# Patient Record
Sex: FEMALE | Race: WHITE | NOT HISPANIC OR LATINO | Employment: OTHER | URBAN - METROPOLITAN AREA
[De-identification: names, ages, dates, MRNs, and addresses within clinical notes are randomized per-mention and may not be internally consistent; named-entity substitution may affect disease eponyms.]

---

## 2023-07-31 DIAGNOSIS — E78.5 DYSLIPIDEMIA, GOAL LDL BELOW 130: Primary | ICD-10-CM

## 2023-08-01 RX ORDER — PRAVASTATIN SODIUM 20 MG/1
20 TABLET ORAL DAILY
Qty: 90 TABLET | Refills: 0 | Status: SHIPPED | OUTPATIENT
Start: 2023-08-01 | End: 2023-10-30

## 2023-09-01 DIAGNOSIS — I10 BENIGN ESSENTIAL HYPERTENSION: Primary | ICD-10-CM

## 2023-09-01 RX ORDER — METOPROLOL SUCCINATE 25 MG/1
25 TABLET, EXTENDED RELEASE ORAL DAILY
Qty: 90 TABLET | Refills: 0 | Status: SHIPPED | OUTPATIENT
Start: 2023-09-01 | End: 2023-11-30

## 2023-09-26 ENCOUNTER — LAB (OUTPATIENT)
Dept: LAB | Facility: LAB | Age: 83
End: 2023-09-26
Payer: MEDICARE

## 2023-09-26 ENCOUNTER — OFFICE VISIT (OUTPATIENT)
Dept: PRIMARY CARE | Facility: CLINIC | Age: 83
End: 2023-09-26
Payer: MEDICARE

## 2023-09-26 VITALS
HEART RATE: 68 BPM | DIASTOLIC BLOOD PRESSURE: 84 MMHG | WEIGHT: 178 LBS | OXYGEN SATURATION: 99 % | BODY MASS INDEX: 29.66 KG/M2 | SYSTOLIC BLOOD PRESSURE: 150 MMHG | HEIGHT: 65 IN

## 2023-09-26 DIAGNOSIS — E78.00 PURE HYPERCHOLESTEROLEMIA: ICD-10-CM

## 2023-09-26 DIAGNOSIS — R73.01 IFG (IMPAIRED FASTING GLUCOSE): Primary | ICD-10-CM

## 2023-09-26 DIAGNOSIS — J44.9 COPD, MODERATE (MULTI): ICD-10-CM

## 2023-09-26 DIAGNOSIS — Z12.11 ENCOUNTER FOR SCREENING FOR MALIGNANT NEOPLASM OF COLON: ICD-10-CM

## 2023-09-26 DIAGNOSIS — I71.21 ANEURYSM OF ASCENDING AORTA WITHOUT RUPTURE (CMS-HCC): ICD-10-CM

## 2023-09-26 DIAGNOSIS — Z12.31 VISIT FOR SCREENING MAMMOGRAM: ICD-10-CM

## 2023-09-26 DIAGNOSIS — R73.01 IFG (IMPAIRED FASTING GLUCOSE): ICD-10-CM

## 2023-09-26 DIAGNOSIS — E78.5 DYSLIPIDEMIA, GOAL LDL BELOW 130: ICD-10-CM

## 2023-09-26 DIAGNOSIS — J40 BRONCHITIS: ICD-10-CM

## 2023-09-26 PROBLEM — H61.23 BILATERAL IMPACTED CERUMEN: Status: ACTIVE | Noted: 2020-10-20

## 2023-09-26 PROBLEM — N95.2 POSTMENOPAUSAL ATROPHIC VAGINITIS: Status: ACTIVE | Noted: 2023-09-26

## 2023-09-26 PROBLEM — M85.80 OSTEOPENIA: Status: ACTIVE | Noted: 2023-09-26

## 2023-09-26 PROBLEM — H90.3 SENSORINEURAL HEARING LOSS, BILATERAL: Status: ACTIVE | Noted: 2021-04-20

## 2023-09-26 PROBLEM — G43.009 COMMON MIGRAINE WITHOUT AURA: Status: ACTIVE | Noted: 2023-09-26

## 2023-09-26 PROBLEM — H90.3 ASYMMETRICAL SENSORINEURAL HEARING LOSS: Status: ACTIVE | Noted: 2021-04-20

## 2023-09-26 PROBLEM — L40.9 PSORIASIS: Status: ACTIVE | Noted: 2023-09-26

## 2023-09-26 PROBLEM — R01.1 HEART MURMUR: Status: ACTIVE | Noted: 2023-09-26

## 2023-09-26 PROBLEM — I10 ESSENTIAL HYPERTENSION: Status: ACTIVE | Noted: 2023-09-26

## 2023-09-26 PROBLEM — N28.1 COMPLEX RENAL CYST: Status: ACTIVE | Noted: 2023-09-26

## 2023-09-26 PROBLEM — H61.20 CERUMEN IMPACTION: Status: ACTIVE | Noted: 2023-09-26

## 2023-09-26 PROBLEM — H60.549 ECZEMA OF EXTERNAL AUDITORY CANAL: Status: ACTIVE | Noted: 2020-10-20

## 2023-09-26 PROBLEM — J30.0 VASOMOTOR RHINITIS: Status: ACTIVE | Noted: 2023-09-26

## 2023-09-26 PROBLEM — R06.2 WHEEZING: Status: ACTIVE | Noted: 2023-09-26

## 2023-09-26 PROBLEM — I71.20 THORACIC AORTIC ANEURYSM WITHOUT RUPTURE (CMS-HCC): Status: ACTIVE | Noted: 2023-09-26

## 2023-09-26 PROBLEM — J43.9 PULMONARY EMPHYSEMA (MULTI): Status: ACTIVE | Noted: 2023-09-26

## 2023-09-26 PROBLEM — E04.2 NONTOXIC MULTINODULAR GOITER: Status: ACTIVE | Noted: 2023-09-26

## 2023-09-26 PROBLEM — U07.1 COVID-19 VIRUS INFECTION: Status: ACTIVE | Noted: 2023-09-26

## 2023-09-26 PROBLEM — R39.89 ABNORMAL COMPLIANCE OF BLADDER: Status: ACTIVE | Noted: 2023-09-26

## 2023-09-26 LAB
ALANINE AMINOTRANSFERASE (SGPT) (U/L) IN SER/PLAS: 14 U/L (ref 7–45)
ALBUMIN (G/DL) IN SER/PLAS: 4 G/DL (ref 3.4–5)
ALKALINE PHOSPHATASE (U/L) IN SER/PLAS: 74 U/L (ref 33–136)
ANION GAP IN SER/PLAS: 13 MMOL/L (ref 10–20)
ASPARTATE AMINOTRANSFERASE (SGOT) (U/L) IN SER/PLAS: 17 U/L (ref 9–39)
BASOPHILS (10*3/UL) IN BLOOD BY AUTOMATED COUNT: 0.05 X10E9/L (ref 0–0.1)
BASOPHILS/100 LEUKOCYTES IN BLOOD BY AUTOMATED COUNT: 0.6 % (ref 0–2)
BILIRUBIN TOTAL (MG/DL) IN SER/PLAS: 0.4 MG/DL (ref 0–1.2)
CALCIUM (MG/DL) IN SER/PLAS: 9.4 MG/DL (ref 8.6–10.3)
CARBON DIOXIDE, TOTAL (MMOL/L) IN SER/PLAS: 33 MMOL/L (ref 21–32)
CHLORIDE (MMOL/L) IN SER/PLAS: 96 MMOL/L (ref 98–107)
CHOLESTEROL (MG/DL) IN SER/PLAS: 158 MG/DL (ref 0–199)
CHOLESTEROL IN HDL (MG/DL) IN SER/PLAS: 62.6 MG/DL
CHOLESTEROL/HDL RATIO: 2.5
CREATINE KINASE (U/L) IN SER/PLAS: 127 U/L (ref 0–215)
CREATININE (MG/DL) IN SER/PLAS: 0.64 MG/DL (ref 0.5–1.05)
EOSINOPHILS (10*3/UL) IN BLOOD BY AUTOMATED COUNT: 0.37 X10E9/L (ref 0–0.4)
EOSINOPHILS/100 LEUKOCYTES IN BLOOD BY AUTOMATED COUNT: 4.7 % (ref 0–6)
ERYTHROCYTE DISTRIBUTION WIDTH (RATIO) BY AUTOMATED COUNT: 13.2 % (ref 11.5–14.5)
ERYTHROCYTE MEAN CORPUSCULAR HEMOGLOBIN CONCENTRATION (G/DL) BY AUTOMATED: 31.4 G/DL (ref 32–36)
ERYTHROCYTE MEAN CORPUSCULAR VOLUME (FL) BY AUTOMATED COUNT: 95 FL (ref 80–100)
ERYTHROCYTES (10*6/UL) IN BLOOD BY AUTOMATED COUNT: 4.87 X10E12/L (ref 4–5.2)
ESTIMATED AVERAGE GLUCOSE FOR HBA1C: 123 MG/DL
GFR FEMALE: 87 ML/MIN/1.73M2
GLUCOSE (MG/DL) IN SER/PLAS: 100 MG/DL (ref 74–99)
HEMATOCRIT (%) IN BLOOD BY AUTOMATED COUNT: 46.5 % (ref 36–46)
HEMOGLOBIN (G/DL) IN BLOOD: 14.6 G/DL (ref 12–16)
HEMOGLOBIN A1C/HEMOGLOBIN TOTAL IN BLOOD: 5.9 %
IMMATURE GRANULOCYTES/100 LEUKOCYTES IN BLOOD BY AUTOMATED COUNT: 0.3 % (ref 0–0.9)
LDL: 74 MG/DL (ref 0–99)
LEUKOCYTES (10*3/UL) IN BLOOD BY AUTOMATED COUNT: 7.9 X10E9/L (ref 4.4–11.3)
LYMPHOCYTES (10*3/UL) IN BLOOD BY AUTOMATED COUNT: 1.72 X10E9/L (ref 0.8–3)
LYMPHOCYTES/100 LEUKOCYTES IN BLOOD BY AUTOMATED COUNT: 21.7 % (ref 13–44)
MONOCYTES (10*3/UL) IN BLOOD BY AUTOMATED COUNT: 0.85 X10E9/L (ref 0.05–0.8)
MONOCYTES/100 LEUKOCYTES IN BLOOD BY AUTOMATED COUNT: 10.7 % (ref 2–10)
NEUTROPHILS (10*3/UL) IN BLOOD BY AUTOMATED COUNT: 4.92 X10E9/L (ref 1.6–5.5)
NEUTROPHILS/100 LEUKOCYTES IN BLOOD BY AUTOMATED COUNT: 62 % (ref 40–80)
PLATELETS (10*3/UL) IN BLOOD AUTOMATED COUNT: 418 X10E9/L (ref 150–450)
POTASSIUM (MMOL/L) IN SER/PLAS: 4.3 MMOL/L (ref 3.5–5.3)
PROTEIN TOTAL: 7 G/DL (ref 6.4–8.2)
SODIUM (MMOL/L) IN SER/PLAS: 138 MMOL/L (ref 136–145)
THYROTROPIN (MIU/L) IN SER/PLAS BY DETECTION LIMIT <= 0.05 MIU/L: 2.31 MIU/L (ref 0.44–3.98)
TRIGLYCERIDE (MG/DL) IN SER/PLAS: 107 MG/DL (ref 0–149)
UREA NITROGEN (MG/DL) IN SER/PLAS: 13 MG/DL (ref 6–23)
VLDL: 21 MG/DL (ref 0–40)

## 2023-09-26 PROCEDURE — 83036 HEMOGLOBIN GLYCOSYLATED A1C: CPT

## 2023-09-26 PROCEDURE — 84443 ASSAY THYROID STIM HORMONE: CPT

## 2023-09-26 PROCEDURE — 36415 COLL VENOUS BLD VENIPUNCTURE: CPT

## 2023-09-26 PROCEDURE — 1160F RVW MEDS BY RX/DR IN RCRD: CPT | Performed by: INTERNAL MEDICINE

## 2023-09-26 PROCEDURE — 82550 ASSAY OF CK (CPK): CPT

## 2023-09-26 PROCEDURE — 3077F SYST BP >= 140 MM HG: CPT | Performed by: INTERNAL MEDICINE

## 2023-09-26 PROCEDURE — 80061 LIPID PANEL: CPT

## 2023-09-26 PROCEDURE — 87637 SARSCOV2&INF A&B&RSV AMP PRB: CPT

## 2023-09-26 PROCEDURE — 3079F DIAST BP 80-89 MM HG: CPT | Performed by: INTERNAL MEDICINE

## 2023-09-26 PROCEDURE — 99214 OFFICE O/P EST MOD 30 MIN: CPT | Performed by: INTERNAL MEDICINE

## 2023-09-26 PROCEDURE — 85025 COMPLETE CBC W/AUTO DIFF WBC: CPT

## 2023-09-26 PROCEDURE — 80053 COMPREHEN METABOLIC PANEL: CPT

## 2023-09-26 PROCEDURE — 1159F MED LIST DOCD IN RCRD: CPT | Performed by: INTERNAL MEDICINE

## 2023-09-26 PROCEDURE — 1036F TOBACCO NON-USER: CPT | Performed by: INTERNAL MEDICINE

## 2023-09-26 RX ORDER — HYDROCHLOROTHIAZIDE 25 MG/1
1 TABLET ORAL DAILY
COMMUNITY
Start: 2013-07-29 | End: 2024-04-29 | Stop reason: SDUPTHER

## 2023-09-26 RX ORDER — VIBEGRON 75 MG/1
TABLET, FILM COATED ORAL
COMMUNITY
Start: 2023-08-14 | End: 2023-12-14 | Stop reason: SDUPTHER

## 2023-09-26 RX ORDER — RISANKIZUMAB-RZAA 150 MG/ML
INJECTION SUBCUTANEOUS
COMMUNITY
Start: 2021-07-02

## 2023-09-26 RX ORDER — METHYLPREDNISOLONE 4 MG/1
TABLET ORAL
Qty: 21 TABLET | Refills: 0 | Status: SHIPPED | OUTPATIENT
Start: 2023-09-26 | End: 2023-10-03

## 2023-09-26 ASSESSMENT — ENCOUNTER SYMPTOMS: COUGH: 1

## 2023-09-26 NOTE — PATIENT INSTRUCTIONS
Cough for 1 week, sounds/looks viral on top of copd  Take medrol jhony for lung inflammation  Sending nasal swab for RSV and covid  Get chest xray  No antibiotics unless chest xray suggests pneumonia    Call [phone] to schedule mammogram    Do stool test for colon screening    Make appt for your medicare wellness, bp, cholesterol, sugar check  Get labs 2-3 days prior to appt

## 2023-09-26 NOTE — PROGRESS NOTES
"Subjective   Patient ID: Regina Reyez is a 83 y.o. female who presents for Cough and Nasal Congestion.    Started about 1 week ago with a constant dry cough and nose is running and has some pressure over her cheeks  Just not going away  No ear pain or pressure  Not sob  She has continued to use the IS at home daily  No fever or body aches  Mild headache  Covid tested and was negative 3 days ago.  Has had sore throat from coughing.     Cough         Review of Systems   Respiratory:  Positive for cough.        Objective   /84   Pulse 68   Ht 1.66 m (5' 5.35\")   Wt 80.7 kg (178 lb)   BMI 29.30 kg/m²     Physical Exam  Constitutional:       Appearance: Normal appearance.   HENT:      Ears:      Comments: B/l tm partially obscured by wax but visualized tm normal     Nose:      Comments: Turbs mildly red, scant cream colored drainage     Mouth/Throat:      Pharynx: Posterior oropharyngeal erythema present. No oropharyngeal exudate.   Cardiovascular:      Rate and Rhythm: Normal rate and regular rhythm.   Pulmonary:      Effort: Pulmonary effort is normal.      Comments: BS clear but slightly diminished in the bases  No bronchial sounds  No rhonchi  Slight wheeze with cough  Neurological:      Mental Status: She is alert.         Assessment/Plan          Patient Instructions   Cough for 1 week, sounds/looks viral on top of copd  Take medrol jhony for lung inflammation  Sending nasal swab for RSV and covid  Get chest xray  No antibiotics unless chest xray suggests pneumonia    Call [phone] to schedule mammogram    Do stool test for colon screening    Make appt for your medicare wellness, bp, cholesterol, sugar check  Get labs 2-3 days prior to appt   "

## 2023-09-26 NOTE — PROGRESS NOTES
Subjective   Patient ID: Regina Reyez is a 83 y.o. female who presents for Cough and Nasal Congestion.    HPI     Review of Systems    Objective   There were no vitals taken for this visit.    Physical Exam    Assessment/Plan

## 2023-09-27 LAB
RSV PCR: NOT DETECTED
SARS-COV-2 RESULT: NOT DETECTED

## 2023-10-25 ENCOUNTER — HOSPITAL ENCOUNTER (OUTPATIENT)
Dept: RADIOLOGY | Facility: HOSPITAL | Age: 83
Discharge: HOME | End: 2023-10-25
Payer: MEDICARE

## 2023-10-25 DIAGNOSIS — Z12.31 VISIT FOR SCREENING MAMMOGRAM: ICD-10-CM

## 2023-10-25 PROCEDURE — 77063 BREAST TOMOSYNTHESIS BI: CPT | Mod: BILATERAL PROCEDURE | Performed by: RADIOLOGY

## 2023-10-25 PROCEDURE — 77067 SCR MAMMO BI INCL CAD: CPT | Mod: 50

## 2023-10-25 PROCEDURE — 77067 SCR MAMMO BI INCL CAD: CPT | Mod: BILATERAL PROCEDURE | Performed by: RADIOLOGY

## 2023-10-27 ENCOUNTER — OFFICE VISIT (OUTPATIENT)
Dept: PRIMARY CARE | Facility: CLINIC | Age: 83
End: 2023-10-27
Payer: MEDICARE

## 2023-10-27 VITALS
HEART RATE: 72 BPM | BODY MASS INDEX: 29.99 KG/M2 | SYSTOLIC BLOOD PRESSURE: 135 MMHG | DIASTOLIC BLOOD PRESSURE: 64 MMHG | HEIGHT: 65 IN | WEIGHT: 180 LBS

## 2023-10-27 DIAGNOSIS — Z00.00 ROUTINE GENERAL MEDICAL EXAMINATION AT HEALTH CARE FACILITY: Primary | ICD-10-CM

## 2023-10-27 DIAGNOSIS — E78.00 PURE HYPERCHOLESTEROLEMIA: ICD-10-CM

## 2023-10-27 DIAGNOSIS — I10 ESSENTIAL HYPERTENSION: ICD-10-CM

## 2023-10-27 DIAGNOSIS — R73.01 IFG (IMPAIRED FASTING GLUCOSE): ICD-10-CM

## 2023-10-27 PROCEDURE — 1159F MED LIST DOCD IN RCRD: CPT | Performed by: INTERNAL MEDICINE

## 2023-10-27 PROCEDURE — 1036F TOBACCO NON-USER: CPT | Performed by: INTERNAL MEDICINE

## 2023-10-27 PROCEDURE — 3078F DIAST BP <80 MM HG: CPT | Performed by: INTERNAL MEDICINE

## 2023-10-27 PROCEDURE — 99214 OFFICE O/P EST MOD 30 MIN: CPT | Performed by: INTERNAL MEDICINE

## 2023-10-27 PROCEDURE — 3075F SYST BP GE 130 - 139MM HG: CPT | Performed by: INTERNAL MEDICINE

## 2023-10-27 PROCEDURE — 1160F RVW MEDS BY RX/DR IN RCRD: CPT | Performed by: INTERNAL MEDICINE

## 2023-10-27 PROCEDURE — 1170F FXNL STATUS ASSESSED: CPT | Performed by: INTERNAL MEDICINE

## 2023-10-27 PROCEDURE — G0439 PPPS, SUBSEQ VISIT: HCPCS | Performed by: INTERNAL MEDICINE

## 2023-10-27 ASSESSMENT — ACTIVITIES OF DAILY LIVING (ADL)
MANAGING_FINANCES: INDEPENDENT
TAKING_MEDICATION: INDEPENDENT
DRESSING: INDEPENDENT
DOING_HOUSEWORK: INDEPENDENT
BATHING: INDEPENDENT
GROCERY_SHOPPING: INDEPENDENT

## 2023-10-27 NOTE — PROGRESS NOTES
"Subjective   Patient ID: Regina Reyez is a 83 y.o. female who presents for Medicare Annual Wellness Visit Subsequent.    HPI     Review of Systems    Objective   Ht 1.66 m (5' 5.35\")   Wt 81.6 kg (180 lb)   BMI 29.63 kg/m²     Physical Exam    Assessment/Plan          "

## 2023-10-27 NOTE — PATIENT INSTRUCTIONS
Prediabetes is well controlled, continue watching diet, limit carbs and get regular exercise/walking is good    Cholesterol is well controlled, continue pravastatin    Bp is well controlled on the hydrochlorothiazide  Continue same dose    Mammogram was normal. Repeat annually  Bone density due in 2030  Colon screen, do stool test annually    All other labs normal    For runny nose use ipratropium nasal spray as needed    Multinodular goiter, no suspicious nodules and followed for 2 years, no further ultrasound needed unless is getting larger    Recheck in 1 year

## 2023-10-27 NOTE — PROGRESS NOTES
"Subjective   Reason for Visit: Regina Reyez is an 83 y.o. female here for a Medicare Wellness visit.     Past Medical, Surgical, and Family History reviewed and updated in chart.    Reviewed all medications by prescribing practitioner or clinical pharmacist (such as prescriptions, OTCs, herbal therapies and supplements) and documented in the medical record.    She has urine leakage and got a bidet and works great. She feels a lot ,   She is on gemtessa from pelvic floor doc, it worked great but has an illness, and was hoarse, so she stopped the gemtessa and it got better. After off for 1 month, she restarted about 2 weeks ago.   The gemtessa works well.     She blows her nose a lot. She tried the ipratropium   Doesn't use regularly.    No cp or pressure  She has been coughing more and gets dry mouth, she was checked for sjogrens in the past  Her voice is cleared up. No GERD symptoms, no heartburn.     Bowels are regular. Every 2-3 days and formed, not hard.           Patient Care Team:  Magaly Merritt DO as PCP - General  Magaly Merritt DO as PCP - [employer] PCP     Review of Systems    Objective   Vitals:  BP (!) 142/93   Pulse 72   Ht 1.66 m (5' 5.35\")   Wt 81.6 kg (180 lb)   BMI 29.63 kg/m²       Physical Exam  Constitutional:       Appearance: Normal appearance.   Neck:      Vascular: No carotid bruit.   Cardiovascular:      Rate and Rhythm: Normal rate and regular rhythm.      Heart sounds: Murmur (grade I systolic murmur) heard.   Pulmonary:      Effort: Pulmonary effort is normal.      Comments: Lung sounds are clear but diminished b/l  Chest:   Breasts:     Right: No mass.      Left: No mass.   Abdominal:      Palpations: There is no mass.      Tenderness: There is no abdominal tenderness.      Comments: No HSM   Musculoskeletal:      Right lower leg: No edema.      Left lower leg: No edema.   Lymphadenopathy:      Upper Body:      Right upper body: No supraclavicular or " axillary adenopathy.      Left upper body: No supraclavicular or axillary adenopathy.   Skin:     Coloration: Skin is not jaundiced or pale.   Neurological:      Mental Status: She is alert and oriented to person, place, and time.   Psychiatric:         Mood and Affect: Mood normal.         Assessment/Plan   Problem List Items Addressed This Visit    None  Visit Diagnoses       Routine general medical examination at health care facility    -  Primary          Patient Instructions   Prediabetes is well controlled, continue watching diet, limit carbs and get regular exercise/walking is good    Cholesterol is well controlled, continue pravastatin    Bp is well controlled on the hydrochlorothiazide  Continue same dose    Mammogram was normal. Repeat annually  Bone density due in 2030  Colon screen, do stool test annually    All other labs normal    For runny nose use ipratropium nasal spray as needed    Multinodular goiter, no suspicious nodules and followed for 2 years, no further ultrasound needed unless is getting larger    Recheck in 1 year

## 2023-10-30 DIAGNOSIS — E78.5 DYSLIPIDEMIA, GOAL LDL BELOW 130: ICD-10-CM

## 2023-10-30 RX ORDER — PRAVASTATIN SODIUM 20 MG/1
20 TABLET ORAL DAILY
Qty: 90 TABLET | Refills: 2 | Status: SHIPPED | OUTPATIENT
Start: 2023-10-30

## 2023-11-30 DIAGNOSIS — I10 BENIGN ESSENTIAL HYPERTENSION: ICD-10-CM

## 2023-11-30 RX ORDER — METOPROLOL SUCCINATE 25 MG/1
25 TABLET, EXTENDED RELEASE ORAL DAILY
Qty: 90 TABLET | Refills: 1 | Status: SHIPPED | OUTPATIENT
Start: 2023-11-30

## 2023-12-14 ENCOUNTER — OFFICE VISIT (OUTPATIENT)
Dept: UROLOGY | Facility: CLINIC | Age: 83
End: 2023-12-14
Payer: MEDICARE

## 2023-12-14 DIAGNOSIS — R39.15 URINARY URGENCY: Primary | ICD-10-CM

## 2023-12-14 PROCEDURE — 99213 OFFICE O/P EST LOW 20 MIN: CPT | Performed by: STUDENT IN AN ORGANIZED HEALTH CARE EDUCATION/TRAINING PROGRAM

## 2023-12-14 PROCEDURE — 1159F MED LIST DOCD IN RCRD: CPT | Performed by: STUDENT IN AN ORGANIZED HEALTH CARE EDUCATION/TRAINING PROGRAM

## 2023-12-14 PROCEDURE — 1036F TOBACCO NON-USER: CPT | Performed by: STUDENT IN AN ORGANIZED HEALTH CARE EDUCATION/TRAINING PROGRAM

## 2023-12-14 PROCEDURE — 1160F RVW MEDS BY RX/DR IN RCRD: CPT | Performed by: STUDENT IN AN ORGANIZED HEALTH CARE EDUCATION/TRAINING PROGRAM

## 2023-12-14 RX ORDER — VIBEGRON 75 MG/1
75 TABLET, FILM COATED ORAL DAILY
Qty: 84 TABLET | Refills: 0 | COMMUNITY
Start: 2023-12-14 | End: 2024-03-07

## 2023-12-14 NOTE — PROGRESS NOTES
CHIEF COMPLAINT: FUV 3 months      HISTORY OF PRESENT ILLNESS:  This is a 83 y.o. female, who presents with a 3 month follow up visit. Patient is taking Gemtesa and it is working well for her bladder symptoms. She is very happy with the results. She has no complaints.             HISTORY OF PRESENT ILLNESS:           Past Medical History  She has a past medical history of Encounter for immunization (10/08/2018), Encounter for screening for other viral diseases (10/08/2018), Nausea (10/18/2019), Other conditions influencing health status, Other specified disorders of kidney and ureter (07/29/2019), Personal history of other diseases of the digestive system (10/24/2019), Personal history of other diseases of the nervous system and sense organs (04/02/2015), Personal history of other diseases of the respiratory system (10/17/2019), Personal history of other drug therapy (10/24/2019), Personal history of other specified conditions (08/19/2019), Phlebitis and thrombophlebitis of other sites (10/24/2019), Radial styloid tenosynovitis (de quervain) (04/12/2016), Right upper quadrant pain (04/08/2016), and Tubulo-interstitial nephritis, 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    Surgical History  She has a past surgical history that includes Other surgical history (09/20/2019); Other surgical history (09/20/2019); Other surgical history (09/20/2019); Other surgical history (09/20/2019); Colonoscopy (10/08/2013); and US guided thyroid biopsy (8/6/2019).     Social History  She reports that she has never smoked. She has never used smokeless tobacco. She reports that she does not drink alcohol and does not use drugs.    Family History  Family History   Problem Relation Name Age of Onset    Leukemia Mother  96    Alcohol abuse Father      COPD Father  72        Allergies  Atorvastatin      A comprehensive 10+ review of systems was negative except for: see hpi                          PHYSICAL EXAMINATION:  BP Readings  "from Last 3 Encounters:   10/27/23 135/64   09/26/23 150/84   12/29/22 126/62      Wt Readings from Last 3 Encounters:   10/27/23 81.6 kg (180 lb)   09/26/23 80.7 kg (178 lb)   12/29/22 83.5 kg (184 lb)      BMI: Estimated body mass index is 29.63 kg/m² as calculated from the following:    Height as of 10/27/23: 1.66 m (5' 5.35\").    Weight as of 10/27/23: 81.6 kg (180 lb).  BSA: Estimated body surface area is 1.94 meters squared as calculated from the following:    Height as of 10/27/23: 1.66 m (5' 5.35\").    Weight as of 10/27/23: 81.6 kg (180 lb).  HEENT: Normocephalic, atraumatic, PER EOMI, nonicteric, trachea normal, thyroid normal, oropharynx normal.  CARDIAC: regular rate & rhythm, S1 & S2 normal.  No heaves, thrills, gallops or murmurs.  LUNGS: Clear to auscultation, no spinal or CV tenderness.  EXTREMITIES: No evidence of cyanosis, clubbing or edema.                   Assessment:    83-year-old with urinary urge incontinence, overactive bladder and nocturia     OAB/Nocturia:  doing great with gemtesa  Continue Gemtesa  Follow up in 3 months with Breanna Garcia MD  "

## 2024-03-14 ENCOUNTER — OFFICE VISIT (OUTPATIENT)
Dept: UROLOGY | Facility: CLINIC | Age: 84
End: 2024-03-14
Payer: MEDICARE

## 2024-03-14 DIAGNOSIS — R39.15 URINARY URGENCY: Primary | ICD-10-CM

## 2024-03-14 DIAGNOSIS — N32.81 OAB (OVERACTIVE BLADDER): ICD-10-CM

## 2024-03-14 PROCEDURE — 1036F TOBACCO NON-USER: CPT | Performed by: STUDENT IN AN ORGANIZED HEALTH CARE EDUCATION/TRAINING PROGRAM

## 2024-03-14 PROCEDURE — 99213 OFFICE O/P EST LOW 20 MIN: CPT | Performed by: STUDENT IN AN ORGANIZED HEALTH CARE EDUCATION/TRAINING PROGRAM

## 2024-03-14 PROCEDURE — G2211 COMPLEX E/M VISIT ADD ON: HCPCS | Performed by: STUDENT IN AN ORGANIZED HEALTH CARE EDUCATION/TRAINING PROGRAM

## 2024-03-14 RX ORDER — VIBEGRON 75 MG/1
75 TABLET, FILM COATED ORAL DAILY
Qty: 84 TABLET | Refills: 0 | Status: SHIPPED | OUTPATIENT
Start: 2024-03-14 | End: 2024-06-06

## 2024-03-14 NOTE — PROGRESS NOTES
HISTORY OF PRESENT ILLNESS:  Regina is an 84 yo female who presents here today for a follow up visit. She reports that the medication has provided her with some improvement. Denies any history of vascular disease.          Past Medical History  She has a past medical history of Encounter for immunization (10/08/2018), Encounter for screening for other viral diseases (10/08/2018), Nausea (10/18/2019), Other conditions influencing health status, Other specified disorders of kidney and ureter (07/29/2019), Personal history of other diseases of the digestive system (10/24/2019), Personal history of other diseases of the nervous system and sense organs (04/02/2015), Personal history of other diseases of the respiratory system (10/17/2019), Personal history of other drug therapy (10/24/2019), Personal history of other specified conditions (08/19/2019), Phlebitis and thrombophlebitis of other sites (10/24/2019), Radial styloid tenosynovitis (de quervain) (04/12/2016), Right upper quadrant pain (04/08/2016), and Tubulo-interstitial nephritis, 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    Surgical History  She has a past surgical history that includes Other surgical history (09/20/2019); Other surgical history (09/20/2019); Other surgical history (09/20/2019); Other surgical history (09/20/2019); Colonoscopy (10/08/2013); and US guided thyroid biopsy (8/6/2019).     Social History  She reports that she has never smoked. She has never used smokeless tobacco. She reports that she does not drink alcohol and does not use drugs.    Family History  Family History   Problem Relation Name Age of Onset    Leukemia Mother  96    Alcohol abuse Father      COPD Father  72        Allergies  Atorvastatin      A comprehensive 10+ review of systems was negative except for: see hpi                          PHYSICAL EXAMINATION:  BP Readings from Last 3 Encounters:   10/27/23 135/64   09/26/23 150/84   12/29/22 126/62      Wt  "Readings from Last 3 Encounters:   10/27/23 81.6 kg (180 lb)   09/26/23 80.7 kg (178 lb)   12/29/22 83.5 kg (184 lb)      BMI: Estimated body mass index is 29.63 kg/m² as calculated from the following:    Height as of 10/27/23: 1.66 m (5' 5.35\").    Weight as of 10/27/23: 81.6 kg (180 lb).  BSA: Estimated body surface area is 1.94 meters squared as calculated from the following:    Height as of 10/27/23: 1.66 m (5' 5.35\").    Weight as of 10/27/23: 81.6 kg (180 lb).  HEENT: Normocephalic, atraumatic, PER EOMI, nonicteric, trachea normal, thyroid normal, oropharynx normal.  CARDIAC: regular rate & rhythm, S1 & S2 normal.  No heaves, thrills, gallops or murmurs.  LUNGS: Clear to auscultation, no spinal or CV tenderness.  EXTREMITIES: No evidence of cyanosis, clubbing or edema.               Assessment:  83-year-old with urinary urge incontinence, overactive bladder and nocturia     OAB/Nocturia:  On Gemtesa, continue taking, no as much improvement as she'd like though    we discussed botox vs sacral neuromodulation: both have similar efficacy 80% patients reports >50% improvement, botox associated with 5% risk of incomplete emptying, increase in UTI and will require re-injection in 6-9 months; and as early as 3 months. SNM is a staged procedure, 2 weeks apart, consisting first of lead implantation then internalization of IPG if there is improvement. Interstim is associated with lead migration, explantation, infection and bleeding, though risks are all <5%. We also discussed PTNS which is associated with success rates comparable to medical therapy but without side-effects without significant major morbidity.       Follow up for UDS    Reed Garcia MD  Scribe Attestation  By signing my name below, I, Ary Sams   attest that this documentation has been prepared under the direction and in the presence of Reed Garcia MD.  "

## 2024-03-25 ENCOUNTER — PROCEDURE VISIT (OUTPATIENT)
Dept: UROLOGY | Facility: CLINIC | Age: 84
End: 2024-03-25
Payer: MEDICARE

## 2024-03-25 DIAGNOSIS — R39.15 URINARY URGENCY: Primary | ICD-10-CM

## 2024-03-25 LAB
POC APPEARANCE, URINE: CLEAR
POC BILIRUBIN, URINE: NEGATIVE
POC BLOOD, URINE: NEGATIVE
POC COLOR, URINE: YELLOW
POC GLUCOSE, URINE: NEGATIVE MG/DL
POC KETONES, URINE: NEGATIVE MG/DL
POC LEUKOCYTES, URINE: ABNORMAL
POC NITRITE,URINE: NEGATIVE
POC PH, URINE: 6 PH
POC PROTEIN, URINE: NEGATIVE MG/DL
POC SPECIFIC GRAVITY, URINE: 1.02
POC UROBILINOGEN, URINE: 0.2 EU/DL

## 2024-03-25 PROCEDURE — 51741 ELECTRO-UROFLOWMETRY FIRST: CPT | Performed by: STUDENT IN AN ORGANIZED HEALTH CARE EDUCATION/TRAINING PROGRAM

## 2024-03-25 PROCEDURE — 51729 CYSTOMETROGRAM W/VP&UP: CPT | Performed by: STUDENT IN AN ORGANIZED HEALTH CARE EDUCATION/TRAINING PROGRAM

## 2024-03-25 PROCEDURE — 51797 INTRAABDOMINAL PRESSURE TEST: CPT | Performed by: STUDENT IN AN ORGANIZED HEALTH CARE EDUCATION/TRAINING PROGRAM

## 2024-03-25 PROCEDURE — 81003 URINALYSIS AUTO W/O SCOPE: CPT | Performed by: STUDENT IN AN ORGANIZED HEALTH CARE EDUCATION/TRAINING PROGRAM

## 2024-03-25 PROCEDURE — 51784 ANAL/URINARY MUSCLE STUDY: CPT | Performed by: STUDENT IN AN ORGANIZED HEALTH CARE EDUCATION/TRAINING PROGRAM

## 2024-03-25 NOTE — PROGRESS NOTES
Regina Angeleshugo 82 y/o female     Patient was referred by Dr. Garcia to evaluate urinary urgency.      Dr. Gaitan was present in the office at time of study.      Pre uroflow was completed today with a PVR of 67ml.  Urine was clear for UTI.      Patient did not leak on CLPP/VLPP.  Patient did not have DO with leak.  PVR after study was 25ml.      Patient instructed to increase fluids if blood in the urine or burning due to cath insertion.  Patient understood and consented to Urodynamics.      Patient will follow up with Dr. Garcia to review results.  03/25/24 CM

## 2024-03-28 ENCOUNTER — OFFICE VISIT (OUTPATIENT)
Dept: UROLOGY | Facility: CLINIC | Age: 84
End: 2024-03-28
Payer: MEDICARE

## 2024-03-28 ENCOUNTER — OFFICE VISIT (OUTPATIENT)
Dept: PRIMARY CARE | Facility: CLINIC | Age: 84
End: 2024-03-28
Payer: MEDICARE

## 2024-03-28 VITALS
HEART RATE: 69 BPM | DIASTOLIC BLOOD PRESSURE: 77 MMHG | WEIGHT: 177 LBS | BODY MASS INDEX: 29.49 KG/M2 | HEIGHT: 65 IN | SYSTOLIC BLOOD PRESSURE: 130 MMHG

## 2024-03-28 DIAGNOSIS — N32.81 OAB (OVERACTIVE BLADDER): Primary | ICD-10-CM

## 2024-03-28 PROBLEM — J40 BRONCHITIS: Status: ACTIVE | Noted: 2024-03-28

## 2024-03-28 PROBLEM — H26.9 CATARACT: Status: ACTIVE | Noted: 2024-03-28

## 2024-03-28 PROBLEM — K37 APPENDICITIS: Status: ACTIVE | Noted: 2024-03-28

## 2024-03-28 PROBLEM — N28.9 DISORDER OF KIDNEY: Status: ACTIVE | Noted: 2024-03-28

## 2024-03-28 PROBLEM — R06.02 SHORTNESS OF BREATH: Status: ACTIVE | Noted: 2024-03-28

## 2024-03-28 PROBLEM — R05.9 COUGH: Status: ACTIVE | Noted: 2018-10-13

## 2024-03-28 PROCEDURE — 1159F MED LIST DOCD IN RCRD: CPT | Performed by: STUDENT IN AN ORGANIZED HEALTH CARE EDUCATION/TRAINING PROGRAM

## 2024-03-28 PROCEDURE — 99214 OFFICE O/P EST MOD 30 MIN: CPT | Performed by: STUDENT IN AN ORGANIZED HEALTH CARE EDUCATION/TRAINING PROGRAM

## 2024-03-28 PROCEDURE — 51729 CYSTOMETROGRAM W/VP&UP: CPT | Performed by: STUDENT IN AN ORGANIZED HEALTH CARE EDUCATION/TRAINING PROGRAM

## 2024-03-28 PROCEDURE — 51741 ELECTRO-UROFLOWMETRY FIRST: CPT | Performed by: STUDENT IN AN ORGANIZED HEALTH CARE EDUCATION/TRAINING PROGRAM

## 2024-03-28 PROCEDURE — 51797 INTRAABDOMINAL PRESSURE TEST: CPT | Performed by: STUDENT IN AN ORGANIZED HEALTH CARE EDUCATION/TRAINING PROGRAM

## 2024-03-28 PROCEDURE — 51784 ANAL/URINARY MUSCLE STUDY: CPT | Performed by: STUDENT IN AN ORGANIZED HEALTH CARE EDUCATION/TRAINING PROGRAM

## 2024-03-28 RX ORDER — ASPIRIN 81 MG/1
81 TABLET ORAL EVERY OTHER DAY
COMMUNITY
Start: 2015-10-05

## 2024-03-28 RX ORDER — VIBEGRON 75 MG/1
75 TABLET, FILM COATED ORAL DAILY
Qty: 84 TABLET | Refills: 0 | COMMUNITY
Start: 2024-03-28 | End: 2024-06-20

## 2024-03-28 NOTE — PROGRESS NOTES
Subjective   Patient ID: Regina Reyez is a 83 y.o. female who presents for Pre-op Exam.    HPI     Review of Systems    Objective   There were no vitals taken for this visit.    Physical Exam    Assessment/Plan   {Assess/PlanSmartLinks:89668}

## 2024-03-28 NOTE — PROGRESS NOTES
HISTORY OF PRESENT ILLNESS:  Regina Reyez is a 83 y.o. female who presents today for a follow up visit to discuss her UDS results.  UDS demonstrates detrusor activity and normal emptying.  She is still taking her Gemtesa and finds that it is somewhat helpful. She would like to try other treatment options if able.          Past Medical History  She has a past medical history of Encounter for immunization (10/08/2018), Encounter for screening for other viral diseases (10/08/2018), Nausea (10/18/2019), Other conditions influencing health status, Other specified disorders of kidney and ureter (07/29/2019), Personal history of other diseases of the digestive system (10/24/2019), Personal history of other diseases of the nervous system and sense organs (04/02/2015), Personal history of other diseases of the respiratory system (10/17/2019), Personal history of other drug therapy (10/24/2019), Personal history of other specified conditions (08/19/2019), Phlebitis and thrombophlebitis of other sites (10/24/2019), Radial styloid tenosynovitis (de quervain) (04/12/2016), Right upper quadrant pain (04/08/2016), and Tubulo-interstitial nephritis, 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    Surgical History  She has a past surgical history that includes Other surgical history (09/20/2019); Other surgical history (09/20/2019); Other surgical history (09/20/2019); Other surgical history (09/20/2019); Colonoscopy (10/08/2013); and US guided thyroid biopsy (8/6/2019).     Social History  She reports that she has never smoked. She has never used smokeless tobacco. She reports that she does not drink alcohol and does not use drugs.    Family History  Family History   Problem Relation Name Age of Onset    Leukemia Mother  96    Alcohol abuse Father      COPD Father  72        Allergies  Atorvastatin      A comprehensive 10+ review of systems was negative except for: see hpi                          PHYSICAL EXAMINATION:  BP  "Readings from Last 3 Encounters:   10/27/23 135/64   09/26/23 150/84   12/29/22 126/62      Wt Readings from Last 3 Encounters:   10/27/23 81.6 kg (180 lb)   09/26/23 80.7 kg (178 lb)   12/29/22 83.5 kg (184 lb)      BMI: Estimated body mass index is 29.63 kg/m² as calculated from the following:    Height as of 10/27/23: 1.66 m (5' 5.35\").    Weight as of 10/27/23: 81.6 kg (180 lb).  BSA: Estimated body surface area is 1.94 meters squared as calculated from the following:    Height as of 10/27/23: 1.66 m (5' 5.35\").    Weight as of 10/27/23: 81.6 kg (180 lb).  HEENT: Normocephalic, atraumatic, PER EOMI, nonicteric, trachea normal, thyroid normal, oropharynx normal.  CARDIAC: regular rate & rhythm, S1 & S2 normal.  No heaves, thrills, gallops or murmurs.  LUNGS: Clear to auscultation, no spinal or CV tenderness.  EXTREMITIES: No evidence of cyanosis, clubbing or edema.               Assessment:    83-year-old with urinary urge incontinence, overactive bladder and nocturia     OAB/Nocturia:  On Gemtesa, continue taking for now, this is somewhat helpful but wants more definitive management  UDS: normal emptying no stress incontinence    She is most interested in the treatment plan, we will screen her for the Restore trial, if she is a candidate we will proceed if not then we will proceed with Botox    Reed Garcia MD    Scribe Attestation  By signing my name below, I, Magdalenafabricio Guerin, Ary   attest that this documentation has been prepared under the direction and in the presence of Reed Garcia MD.    "

## 2024-04-17 ENCOUNTER — OFFICE VISIT (OUTPATIENT)
Dept: PRIMARY CARE | Facility: CLINIC | Age: 84
End: 2024-04-17
Payer: MEDICARE

## 2024-04-17 VITALS
WEIGHT: 175 LBS | SYSTOLIC BLOOD PRESSURE: 121 MMHG | HEIGHT: 65 IN | HEART RATE: 66 BPM | BODY MASS INDEX: 29.16 KG/M2 | DIASTOLIC BLOOD PRESSURE: 71 MMHG

## 2024-04-17 DIAGNOSIS — M18.11 ARTHRITIS OF CARPOMETACARPAL (CMC) JOINT OF RIGHT THUMB: ICD-10-CM

## 2024-04-17 DIAGNOSIS — Z01.818 PREOP EXAMINATION: Primary | ICD-10-CM

## 2024-04-17 DIAGNOSIS — I10 ESSENTIAL HYPERTENSION: ICD-10-CM

## 2024-04-17 PROCEDURE — 3078F DIAST BP <80 MM HG: CPT | Performed by: INTERNAL MEDICINE

## 2024-04-17 PROCEDURE — 1159F MED LIST DOCD IN RCRD: CPT | Performed by: INTERNAL MEDICINE

## 2024-04-17 PROCEDURE — 3074F SYST BP LT 130 MM HG: CPT | Performed by: INTERNAL MEDICINE

## 2024-04-17 PROCEDURE — 1160F RVW MEDS BY RX/DR IN RCRD: CPT | Performed by: INTERNAL MEDICINE

## 2024-04-17 PROCEDURE — 1036F TOBACCO NON-USER: CPT | Performed by: INTERNAL MEDICINE

## 2024-04-17 PROCEDURE — 99213 OFFICE O/P EST LOW 20 MIN: CPT | Performed by: INTERNAL MEDICINE

## 2024-04-17 NOTE — PROGRESS NOTES
Subjective   Patient ID: Regina Reyez is a 83 y.o. female who presents for Pre-op Exam.    HPI     Review of Systems    Objective   There were no vitals taken for this visit.    Physical Exam    Assessment/Plan

## 2024-04-17 NOTE — PATIENT INSTRUCTIONS
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

## 2024-04-17 NOTE — PROGRESS NOTES
"Subjective   Patient ID: Regina Reyez is a 83 y.o. female who presents for Pre-op Exam.    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 Is going to do a block and twilight  No cp or pressure  No sob, but she cannot do what she has done, she was pulling fallen branches to the road today  She can go up 2 flights of steps without stopping.   Bowels are moving normally  Has urge incontinence.     She is having bladder issues and seeing urology/sheyn, is on Gemtessa.  They are doing a study about putting a nerve/stimulator in the ankle to stimulate the bladder nerves.   Is she okay to have this         Review of Systems    Objective   /71   Pulse 66   Ht 1.66 m (5' 5.35\")   Wt 79.4 kg (175 lb)   BMI 28.81 kg/m²     Physical Exam  Constitutional:       Appearance: Normal appearance.   Neck:      Vascular: No carotid bruit.   Cardiovascular:      Rate and Rhythm: Normal rate and regular rhythm.      Heart sounds: Murmur (grade I rusb without radiation) heard.   Pulmonary:      Breath sounds: No stridor. No wheezing.   Abdominal:      Palpations: Abdomen is soft.      Tenderness: There is no abdominal tenderness.   Musculoskeletal:      Right lower leg: No edema.      Left lower leg: No edema.   Skin:     Coloration: Skin is not jaundiced or pale.   Neurological:      Mental Status: She is alert and oriented to person, place, and time.   Psychiatric:         Mood and Affect: Mood normal.         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

## 2024-04-26 ENCOUNTER — PREP FOR PROCEDURE (OUTPATIENT)
Dept: UROLOGY | Facility: CLINIC | Age: 84
End: 2024-04-26
Payer: MEDICARE

## 2024-04-26 DIAGNOSIS — N32.81 OAB (OVERACTIVE BLADDER): Primary | ICD-10-CM

## 2024-04-26 RX ORDER — CEFAZOLIN SODIUM 2 G/100ML
2 INJECTION, SOLUTION INTRAVENOUS ONCE
OUTPATIENT
Start: 2024-04-26 | End: 2024-04-26

## 2024-04-29 DIAGNOSIS — I10 ESSENTIAL HYPERTENSION: ICD-10-CM

## 2024-04-29 RX ORDER — HYDROCHLOROTHIAZIDE 25 MG/1
25 TABLET ORAL DAILY
Qty: 90 TABLET | Refills: 1 | Status: SHIPPED | OUTPATIENT
Start: 2024-04-29

## 2024-04-29 NOTE — TELEPHONE ENCOUNTER
Requested Prescriptions     Pending Prescriptions Disp Refills    hydroCHLOROthiazide (HYDRODiuril) 25 mg tablet 90 tablet 1     Sig: Take 1 tablet (25 mg) by mouth once daily.

## 2024-05-03 ENCOUNTER — LAB (OUTPATIENT)
Dept: LAB | Facility: LAB | Age: 84
End: 2024-05-03
Payer: MEDICARE

## 2024-05-03 DIAGNOSIS — I10 ESSENTIAL HYPERTENSION: ICD-10-CM

## 2024-05-03 DIAGNOSIS — M18.11 ARTHRITIS OF CARPOMETACARPAL (CMC) JOINT OF RIGHT THUMB: ICD-10-CM

## 2024-05-03 DIAGNOSIS — Z01.818 PREOP EXAMINATION: ICD-10-CM

## 2024-05-03 LAB
ANION GAP SERPL CALC-SCNC: 15 MMOL/L (ref 10–20)
BASOPHILS # BLD AUTO: 0.04 X10*3/UL (ref 0–0.1)
BASOPHILS NFR BLD AUTO: 0.6 %
BUN SERPL-MCNC: 25 MG/DL (ref 6–23)
CALCIUM SERPL-MCNC: 9.3 MG/DL (ref 8.6–10.3)
CHLORIDE SERPL-SCNC: 96 MMOL/L (ref 98–107)
CO2 SERPL-SCNC: 29 MMOL/L (ref 21–32)
CREAT SERPL-MCNC: 0.78 MG/DL (ref 0.5–1.05)
EGFRCR SERPLBLD CKD-EPI 2021: 75 ML/MIN/1.73M*2
EOSINOPHIL # BLD AUTO: 0.33 X10*3/UL (ref 0–0.4)
EOSINOPHIL NFR BLD AUTO: 4.8 %
ERYTHROCYTE [DISTWIDTH] IN BLOOD BY AUTOMATED COUNT: 13.5 % (ref 11.5–14.5)
GLUCOSE SERPL-MCNC: 151 MG/DL (ref 74–99)
HCT VFR BLD AUTO: 44.3 % (ref 36–46)
HGB BLD-MCNC: 14.2 G/DL (ref 12–16)
IMM GRANULOCYTES # BLD AUTO: 0.02 X10*3/UL (ref 0–0.5)
IMM GRANULOCYTES NFR BLD AUTO: 0.3 % (ref 0–0.9)
LYMPHOCYTES # BLD AUTO: 1.6 X10*3/UL (ref 0.8–3)
LYMPHOCYTES NFR BLD AUTO: 23.5 %
MCH RBC QN AUTO: 30.2 PG (ref 26–34)
MCHC RBC AUTO-ENTMCNC: 32.1 G/DL (ref 32–36)
MCV RBC AUTO: 94 FL (ref 80–100)
MONOCYTES # BLD AUTO: 0.54 X10*3/UL (ref 0.05–0.8)
MONOCYTES NFR BLD AUTO: 7.9 %
NEUTROPHILS # BLD AUTO: 4.29 X10*3/UL (ref 1.6–5.5)
NEUTROPHILS NFR BLD AUTO: 62.9 %
NRBC BLD-RTO: 0 /100 WBCS (ref 0–0)
PLATELET # BLD AUTO: 372 X10*3/UL (ref 150–450)
POTASSIUM SERPL-SCNC: 4.2 MMOL/L (ref 3.5–5.3)
RBC # BLD AUTO: 4.7 X10*6/UL (ref 4–5.2)
SODIUM SERPL-SCNC: 136 MMOL/L (ref 136–145)
WBC # BLD AUTO: 6.8 X10*3/UL (ref 4.4–11.3)

## 2024-05-03 PROCEDURE — 80048 BASIC METABOLIC PNL TOTAL CA: CPT

## 2024-05-03 PROCEDURE — 85025 COMPLETE CBC W/AUTO DIFF WBC: CPT

## 2024-05-03 PROCEDURE — 36415 COLL VENOUS BLD VENIPUNCTURE: CPT

## 2024-05-06 ENCOUNTER — CLINICAL SUPPORT (OUTPATIENT)
Dept: PRIMARY CARE | Facility: CLINIC | Age: 84
End: 2024-05-06
Payer: MEDICARE

## 2024-05-06 DIAGNOSIS — R73.01 IFG (IMPAIRED FASTING GLUCOSE): Primary | ICD-10-CM

## 2024-05-06 DIAGNOSIS — E78.00 PURE HYPERCHOLESTEROLEMIA: ICD-10-CM

## 2024-05-06 DIAGNOSIS — Z01.818 PREOP EXAMINATION: ICD-10-CM

## 2024-05-06 PROCEDURE — 93000 ELECTROCARDIOGRAM COMPLETE: CPT | Performed by: INTERNAL MEDICINE

## 2024-05-09 ENCOUNTER — TELEMEDICINE (OUTPATIENT)
Dept: UROLOGY | Facility: CLINIC | Age: 84
End: 2024-05-09
Payer: MEDICARE

## 2024-05-09 DIAGNOSIS — N32.81 OAB (OVERACTIVE BLADDER): Primary | ICD-10-CM

## 2024-05-09 PROCEDURE — 1160F RVW MEDS BY RX/DR IN RCRD: CPT | Performed by: STUDENT IN AN ORGANIZED HEALTH CARE EDUCATION/TRAINING PROGRAM

## 2024-05-09 PROCEDURE — 1159F MED LIST DOCD IN RCRD: CPT | Performed by: STUDENT IN AN ORGANIZED HEALTH CARE EDUCATION/TRAINING PROGRAM

## 2024-05-09 PROCEDURE — 99442 PR PHYS/QHP TELEPHONE EVALUATION 11-20 MIN: CPT | Performed by: STUDENT IN AN ORGANIZED HEALTH CARE EDUCATION/TRAINING PROGRAM

## 2024-05-09 NOTE — PROGRESS NOTES
HISTORY OF PRESENT ILLNESS:  Regina Reyez is a 83 y.o. female who presents today for a virtual follow up visit.  Patient is sided over and is going to proceed with the restore trial, she was already prescreened and is eligible.           Past Medical History  She has a past medical history of Aneurysm of thoracic aorta (CMS-HCC), COPD (chronic obstructive pulmonary disease) (Multi), Encounter for immunization (10/08/2018), Encounter for screening for other viral diseases (10/08/2018), Hyperlipidemia, Hypertension, Murmur, cardiac, Nausea (10/18/2019), Other conditions influencing health status, Other specified disorders of kidney and ureter (07/29/2019), Personal history of other diseases of the digestive system (10/24/2019), Personal history of other diseases of the nervous system and sense organs (04/02/2015), Personal history of other diseases of the respiratory system (10/17/2019), Personal history of other drug therapy (10/24/2019), Personal history of other specified conditions (08/19/2019), Phlebitis and thrombophlebitis of other sites (10/24/2019), Radial styloid tenosynovitis (de quervain) (04/12/2016), Right upper quadrant pain (04/08/2016), and Tubulo-interstitial nephritis, 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    Surgical History  She has a past surgical history that includes Other surgical history (09/20/2019); Other surgical history (09/20/2019); Other surgical history (09/20/2019); Other surgical history (09/20/2019); Colonoscopy (10/08/2013); and US guided thyroid biopsy (8/6/2019).     Social History  She reports that she has never smoked. She has never used smokeless tobacco. She reports that she does not drink alcohol and does not use drugs.    Family History  Family History   Problem Relation Name Age of Onset    Leukemia Mother  96    Alcohol abuse Father      COPD Father  72        Allergies  Atorvastatin      A comprehensive 10+ review of systems was negative except for: see hpi                    Assessment:  83-year-old with urinary urge incontinence, overactive bladder and nocturia     OAB/Nocturia:  On Gemtesa, continue taking for now, this is somewhat helpful but wants more definitive management  UDS: normal emptying no stress incontinence     She is most interested in the treatment plan, we will screen her for the Restore trial, if she is a candidate we will proceed if not then we will proceed with Botox  Surgery tentatively scheduled for 7/24/24      All questions and concerns were answered and addressed.  The patient expressed understanding and agrees with the plan.     Reed Garcia MD    Scribe Attestation  By signing my name below, I, Magdalena Guerin, Scribe   attest that this documentation has been prepared under the direction and in the presence of Reed Garcia MD.

## 2024-05-10 ENCOUNTER — PRE-ADMISSION TESTING (OUTPATIENT)
Dept: PREADMISSION TESTING | Facility: HOSPITAL | Age: 84
End: 2024-05-10
Payer: MEDICARE

## 2024-07-08 ENCOUNTER — DOCUMENTATION (OUTPATIENT)
Dept: UROLOGY | Facility: HOSPITAL | Age: 84
End: 2024-07-08

## 2024-07-08 ENCOUNTER — APPOINTMENT (OUTPATIENT)
Dept: UROLOGY | Facility: CLINIC | Age: 84
End: 2024-07-08
Payer: MEDICARE

## 2024-07-08 ENCOUNTER — LAB (OUTPATIENT)
Dept: LAB | Facility: LAB | Age: 84
End: 2024-07-08
Payer: MEDICARE

## 2024-07-08 DIAGNOSIS — N32.81 OAB (OVERACTIVE BLADDER): ICD-10-CM

## 2024-07-08 DIAGNOSIS — Z00.6 RESEARCH STUDY PATIENT: ICD-10-CM

## 2024-07-08 DIAGNOSIS — E78.00 PURE HYPERCHOLESTEROLEMIA: ICD-10-CM

## 2024-07-08 DIAGNOSIS — R73.01 IFG (IMPAIRED FASTING GLUCOSE): ICD-10-CM

## 2024-07-08 LAB
ALBUMIN SERPL BCP-MCNC: 4.4 G/DL (ref 3.4–5)
ALP SERPL-CCNC: 64 U/L (ref 33–136)
ALT SERPL W P-5'-P-CCNC: 20 U/L (ref 7–45)
ANION GAP SERPL CALC-SCNC: 14 MMOL/L (ref 10–20)
AST SERPL W P-5'-P-CCNC: 22 U/L (ref 9–39)
BILIRUB SERPL-MCNC: 0.6 MG/DL (ref 0–1.2)
BUN SERPL-MCNC: 19 MG/DL (ref 6–23)
CALCIUM SERPL-MCNC: 9.9 MG/DL (ref 8.6–10.3)
CHLORIDE SERPL-SCNC: 96 MMOL/L (ref 98–107)
CHOLEST SERPL-MCNC: 181 MG/DL (ref 0–199)
CHOLESTEROL/HDL RATIO: 2.8
CK SERPL-CCNC: 157 U/L (ref 0–215)
CO2 SERPL-SCNC: 31 MMOL/L (ref 21–32)
CREAT SERPL-MCNC: 0.64 MG/DL (ref 0.5–1.05)
EGFRCR SERPLBLD CKD-EPI 2021: 87 ML/MIN/1.73M*2
GLUCOSE SERPL-MCNC: 104 MG/DL (ref 74–99)
HDLC SERPL-MCNC: 63.9 MG/DL
LDLC SERPL CALC-MCNC: 96 MG/DL
NON HDL CHOLESTEROL: 117 MG/DL (ref 0–149)
POC APPEARANCE, URINE: CLEAR
POC BILIRUBIN, URINE: NEGATIVE
POC BLOOD, URINE: NEGATIVE
POC COLOR, URINE: YELLOW
POC GLUCOSE, URINE: NEGATIVE MG/DL
POC KETONES, URINE: NEGATIVE MG/DL
POC LEUKOCYTES, URINE: NEGATIVE
POC NITRITE,URINE: NEGATIVE
POC PH, URINE: 6.5 PH
POC PROTEIN, URINE: NEGATIVE MG/DL
POC SPECIFIC GRAVITY, URINE: 1.01
POC UROBILINOGEN, URINE: 0.2 EU/DL
POTASSIUM SERPL-SCNC: 4.6 MMOL/L (ref 3.5–5.3)
PROT SERPL-MCNC: 6.9 G/DL (ref 6.4–8.2)
SODIUM SERPL-SCNC: 136 MMOL/L (ref 136–145)
TRIGL SERPL-MCNC: 104 MG/DL (ref 0–149)
VLDL: 21 MG/DL (ref 0–40)

## 2024-07-08 PROCEDURE — 80053 COMPREHEN METABOLIC PANEL: CPT

## 2024-07-08 PROCEDURE — 80061 LIPID PANEL: CPT

## 2024-07-08 PROCEDURE — G2211 COMPLEX E/M VISIT ADD ON: HCPCS | Performed by: STUDENT IN AN ORGANIZED HEALTH CARE EDUCATION/TRAINING PROGRAM

## 2024-07-08 PROCEDURE — 83036 HEMOGLOBIN GLYCOSYLATED A1C: CPT

## 2024-07-08 PROCEDURE — 81003 URINALYSIS AUTO W/O SCOPE: CPT | Performed by: STUDENT IN AN ORGANIZED HEALTH CARE EDUCATION/TRAINING PROGRAM

## 2024-07-08 PROCEDURE — 82550 ASSAY OF CK (CPK): CPT

## 2024-07-08 PROCEDURE — 99214 OFFICE O/P EST MOD 30 MIN: CPT | Performed by: STUDENT IN AN ORGANIZED HEALTH CARE EDUCATION/TRAINING PROGRAM

## 2024-07-08 PROCEDURE — 36415 COLL VENOUS BLD VENIPUNCTURE: CPT

## 2024-07-08 NOTE — RESEARCH NOTES
Reason for Visit: Patient is participating in a research study as per specific study detail below.    IRB #: [id number]  Study visit time point: Consent  Study short name: RESTORE  Type of Visit: Screening    The study was explained, the subject had adequate time to read the consent, questions were answered (if any), subject agreed to participate and signed the consent form, and a copy of the signed consent form was given to subject. No study specific procedures were performed before the subject signed consent.

## 2024-07-08 NOTE — PROGRESS NOTES
She is scheduled to undergo HISTORY OF PRESENT ILLNESS:  Regina Reyez is a 84 y.o. female who presents today for a follow up visit.  Tibial neurostimulation in a few weeks.  Testing was completed today.         Past Medical History  She has a past medical history of Aneurysm of thoracic aorta (CMS-HCC), COPD (chronic obstructive pulmonary disease) (Multi), Encounter for immunization (10/08/2018), Encounter for screening for other viral diseases (10/08/2018), Hyperlipidemia, Hypertension, Murmur, cardiac, Nausea (10/18/2019), Other conditions influencing health status, Other specified disorders of kidney and ureter (07/29/2019), Personal history of other diseases of the digestive system (10/24/2019), Personal history of other diseases of the nervous system and sense organs (04/02/2015), Personal history of other diseases of the respiratory system (10/17/2019), Personal history of other drug therapy (10/24/2019), Personal history of other specified conditions (08/19/2019), Phlebitis and thrombophlebitis of other sites (10/24/2019), Radial styloid tenosynovitis (de quervain) (04/12/2016), Right upper quadrant pain (04/08/2016), and Tubulo-interstitial nephritis, 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    Surgical History  She has a past surgical history that includes Other surgical history (09/20/2019); Other surgical history (09/20/2019); Other surgical history (09/20/2019); Other surgical history (09/20/2019); Colonoscopy (10/08/2013); and US guided thyroid biopsy (8/6/2019).     Social History  She reports that she has never smoked. She has never used smokeless tobacco. She reports that she does not drink alcohol and does not use drugs.    Family History  Family History   Problem Relation Name Age of Onset    Leukemia Mother  96    Alcohol abuse Father      COPD Father  72        Allergies  Atorvastatin      A comprehensive 10+ review of systems was negative except for: see hpi                         "  PHYSICAL EXAMINATION:  BP Readings from Last 3 Encounters:   04/17/24 121/71   03/28/24 130/77   10/27/23 135/64      Wt Readings from Last 3 Encounters:   04/17/24 79.4 kg (175 lb)   03/28/24 80.3 kg (177 lb)   10/27/23 81.6 kg (180 lb)      BMI: Estimated body mass index is 28.81 kg/m² as calculated from the following:    Height as of 4/17/24: 1.66 m (5' 5.35\").    Weight as of 4/17/24: 79.4 kg (175 lb).  BSA: Estimated body surface area is 1.91 meters squared as calculated from the following:    Height as of 4/17/24: 1.66 m (5' 5.35\").    Weight as of 4/17/24: 79.4 kg (175 lb).  HEENT: Normocephalic, atraumatic, PER EOMI, nonicteric, trachea normal, thyroid normal, oropharynx normal.  CARDIAC: regular rate & rhythm, S1 & S2 normal.  No heaves, thrills, gallops or murmurs.  LUNGS: Clear to auscultation, no spinal or CV tenderness.  EXTREMITIES: No evidence of cyanosis, clubbing or edema.    Stage 0 prolapse  5 out of 5 muscle  strength  No evidence of pelvic pain on exam           Assessment:  83-year-old with urinary urge incontinence, overactive bladder and nocturia     OAB/Nocturia:  On Gemtesa, continue taking for now, this is somewhat helpful but wants more definitive management  UDS: normal emptying no stress incontinence     Will be proceeding with the Restore trial  Follow up post op       All questions and concerns were answered and addressed.  The patient expressed understanding and agrees with the plan.     Reed Garcia MD    Scribe Attestation  By signing my name below, I, Ary Sams   attest that this documentation has been prepared under the direction and in the presence of Reed Garcia MD.  "

## 2024-07-09 LAB
EST. AVERAGE GLUCOSE BLD GHB EST-MCNC: 131 MG/DL
HBA1C MFR BLD: 6.2 %

## 2024-07-15 DIAGNOSIS — G43.009 MIGRAINE WITHOUT AURA AND WITHOUT STATUS MIGRAINOSUS, NOT INTRACTABLE: ICD-10-CM

## 2024-07-15 DIAGNOSIS — I10 BENIGN ESSENTIAL HYPERTENSION: ICD-10-CM

## 2024-07-16 RX ORDER — METOPROLOL SUCCINATE 25 MG/1
25 TABLET, EXTENDED RELEASE ORAL DAILY
Qty: 90 TABLET | Refills: 1 | Status: SHIPPED | OUTPATIENT
Start: 2024-07-16

## 2024-07-16 RX ORDER — AMITRIPTYLINE HYDROCHLORIDE 50 MG/1
TABLET, FILM COATED ORAL
Qty: 90 TABLET | Refills: 3 | Status: SHIPPED | OUTPATIENT
Start: 2024-07-16

## 2024-07-18 ENCOUNTER — PREP FOR PROCEDURE (OUTPATIENT)
Dept: UROLOGY | Facility: CLINIC | Age: 84
End: 2024-07-18
Payer: MEDICARE

## 2024-07-18 DIAGNOSIS — N32.81 OAB (OVERACTIVE BLADDER): Primary | ICD-10-CM

## 2024-07-18 DIAGNOSIS — N39.41 URGE INCONTINENCE: ICD-10-CM

## 2024-07-18 RX ORDER — CEFAZOLIN SODIUM 2 G/100ML
2 INJECTION, SOLUTION INTRAVENOUS ONCE
OUTPATIENT
Start: 2024-07-18 | End: 2024-07-18

## 2024-07-19 ENCOUNTER — TELEPHONE (OUTPATIENT)
Dept: UROLOGY | Facility: CLINIC | Age: 84
End: 2024-07-19
Payer: MEDICARE

## 2024-07-19 NOTE — TELEPHONE ENCOUNTER
Patient called about her getting the implant in her back instead of ankle and was inquiring about keeping all set appointments. I explained to her she will keep all appointments as is. Patient understands.

## 2024-07-22 ENCOUNTER — ANESTHESIA EVENT (OUTPATIENT)
Dept: OPERATING ROOM | Facility: HOSPITAL | Age: 84
End: 2024-07-22
Payer: MEDICARE

## 2024-07-23 ENCOUNTER — PRE-ADMISSION TESTING (OUTPATIENT)
Dept: PREADMISSION TESTING | Facility: HOSPITAL | Age: 84
End: 2024-07-23
Payer: MEDICARE

## 2024-07-23 VITALS
DIASTOLIC BLOOD PRESSURE: 67 MMHG | RESPIRATION RATE: 16 BRPM | HEIGHT: 65 IN | OXYGEN SATURATION: 96 % | BODY MASS INDEX: 29.38 KG/M2 | HEART RATE: 70 BPM | WEIGHT: 176.37 LBS | SYSTOLIC BLOOD PRESSURE: 103 MMHG | TEMPERATURE: 97.2 F

## 2024-07-23 DIAGNOSIS — Z00.6 RESEARCH STUDY PATIENT: ICD-10-CM

## 2024-07-23 DIAGNOSIS — N32.81 OAB (OVERACTIVE BLADDER): ICD-10-CM

## 2024-07-23 DIAGNOSIS — Z01.818 PRE-OPERATIVE EXAMINATION: Primary | ICD-10-CM

## 2024-07-23 LAB
BASOPHILS # BLD AUTO: 0.04 X10*3/UL (ref 0–0.1)
BASOPHILS NFR BLD AUTO: 0.6 %
EOSINOPHIL # BLD AUTO: 0.27 X10*3/UL (ref 0–0.4)
EOSINOPHIL NFR BLD AUTO: 3.9 %
ERYTHROCYTE [DISTWIDTH] IN BLOOD BY AUTOMATED COUNT: 13.5 % (ref 11.5–14.5)
HCT VFR BLD AUTO: 43.8 % (ref 36–46)
HGB BLD-MCNC: 14.2 G/DL (ref 12–16)
IMM GRANULOCYTES # BLD AUTO: 0.01 X10*3/UL (ref 0–0.5)
IMM GRANULOCYTES NFR BLD AUTO: 0.1 % (ref 0–0.9)
LYMPHOCYTES # BLD AUTO: 1.55 X10*3/UL (ref 0.8–3)
LYMPHOCYTES NFR BLD AUTO: 22.6 %
MCH RBC QN AUTO: 30.1 PG (ref 26–34)
MCHC RBC AUTO-ENTMCNC: 32.4 G/DL (ref 32–36)
MCV RBC AUTO: 93 FL (ref 80–100)
MONOCYTES # BLD AUTO: 0.61 X10*3/UL (ref 0.05–0.8)
MONOCYTES NFR BLD AUTO: 8.9 %
NEUTROPHILS # BLD AUTO: 4.39 X10*3/UL (ref 1.6–5.5)
NEUTROPHILS NFR BLD AUTO: 63.9 %
NRBC BLD-RTO: 0 /100 WBCS (ref 0–0)
PLATELET # BLD AUTO: 363 X10*3/UL (ref 150–450)
RBC # BLD AUTO: 4.72 X10*6/UL (ref 4–5.2)
WBC # BLD AUTO: 6.9 X10*3/UL (ref 4.4–11.3)

## 2024-07-23 PROCEDURE — 85025 COMPLETE CBC W/AUTO DIFF WBC: CPT

## 2024-07-23 PROCEDURE — 36415 COLL VENOUS BLD VENIPUNCTURE: CPT

## 2024-07-23 PROCEDURE — 99204 OFFICE O/P NEW MOD 45 MIN: CPT | Performed by: REGISTERED NURSE

## 2024-07-23 ASSESSMENT — ENCOUNTER SYMPTOMS
NEUROLOGICAL NEGATIVE: 1
GASTROINTESTINAL NEGATIVE: 1
RESPIRATORY NEGATIVE: 1
CARDIOVASCULAR NEGATIVE: 1
MUSCULOSKELETAL NEGATIVE: 1
NECK NEGATIVE: 1
CONSTITUTIONAL NEGATIVE: 1
EYES NEGATIVE: 1

## 2024-07-23 ASSESSMENT — DUKE ACTIVITY SCORE INDEX (DASI)
CAN YOU PARTICIPATE IN STRENOUS SPORTS LIKE SWIMMING, SINGLES TENNIS, FOOTBALL, BASKETBALL, OR SKIING: NO
CAN YOU HAVE SEXUAL RELATIONS: NO
CAN YOU DO HEAVY WORK AROUND THE HOUSE LIKE SCRUBBING FLOORS OR LIFTING AND MOVING HEAVY FURNITURE: YES
CAN YOU PARTICIPATE IN MODERATE RECREATIONAL ACTIVITIES LIKE GOLF, BOWLING, DANCING, DOUBLES TENNIS OR THROWING A BASEBALL OR FOOTBALL: NO
CAN YOU DO LIGHT WORK AROUND THE HOUSE LIKE DUSTING OR WASHING DISHES: YES
TOTAL_SCORE: 34.95
CAN YOU WALK INDOORS, SUCH AS AROUND YOUR HOUSE: YES
CAN YOU WALK A BLOCK OR TWO ON LEVEL GROUND: YES
CAN YOU RUN A SHORT DISTANCE: YES
CAN YOU DO MODERATE WORK AROUND THE HOUSE LIKE VACUUMING, SWEEPING FLOORS OR CARRYING GROCERIES: YES
CAN YOU TAKE CARE OF YOURSELF (EAT, DRESS, BATHE, OR USE TOILET): YES
CAN YOU DO YARD WORK LIKE RAKING LEAVES, WEEDING OR PUSHING A MOWER: NO
DASI METS SCORE: 7
CAN YOU CLIMB A FLIGHT OF STAIRS OR WALK UP A HILL: YES

## 2024-07-23 ASSESSMENT — PAIN SCALES - GENERAL: PAINLEVEL_OUTOF10: 0 - NO PAIN

## 2024-07-23 ASSESSMENT — ACTIVITIES OF DAILY LIVING (ADL): ADL_SCORE: 0

## 2024-07-23 ASSESSMENT — LIFESTYLE VARIABLES: SMOKING_STATUS: NONSMOKER

## 2024-07-23 NOTE — PREPROCEDURE INSTRUCTIONS
Medication List            Accurate as of July 23, 2024 11:40 AM. Always use your most recent med list.                amitriptyline 50 mg tablet  Commonly known as: Elavil  TAKE 1 TABLET DAILY AT     BEDTIME  Medication Adjustments for Surgery: Take morning of surgery with sip of water, no other fluids     aspirin 81 mg EC tablet  Medication Adjustments for Surgery: Take morning of surgery with sip of water, no other fluids     hydroCHLOROthiazide 25 mg tablet  Commonly known as: HYDRODiuril  Take 1 tablet (25 mg) by mouth once daily.  Medication Adjustments for Surgery: Take morning of surgery with sip of water, no other fluids     metoprolol succinate XL 25 mg 24 hr tablet  Commonly known as: Toprol-XL  TAKE 1 TABLET DAILY  Medication Adjustments for Surgery: Take morning of surgery with sip of water, no other fluids     pravastatin 20 mg tablet  Commonly known as: Pravachol  TAKE 1 TABLET DAILY  Medication Adjustments for Surgery: Take morning of surgery with sip of water, no other fluids     Skyrizi 150 mg/mL syringe  Generic drug: risankizumab-rzaa  Medication Adjustments for Surgery: Other (Comment)  Notes to patient: Last dose over a  month ago                                  Thank you for visiting [facility] ([facility]) today for your pre-procedure evaluation, you were seen by     Kiah Suarez CNP  Pre Admission Testing  [facility]  [phone]    This summary includes instructions and information to aid you during your perioperative period.  Please read carefully. If you have any questions about your visit today, please call the number listed above.  If you become ill or have any changes to your health before your surgery, please contact your primary care provider and alert your surgeon.    Preparing for your Surgery       Exercises  Preoperative Deep Breathing Exercises  Why it is important to do deep breathing exercises before my surgery?  Deep breathing exercises strengthen  your breathing muscles.  This helps you to recover after your surgery and decreases the chance of breathing complications.  How are the deep breathing exercises done?  Sit straight with your back supported.  Breathe in deeply and slowly through your nose. Your lower rib cage should expand and your abdomen may move forward.  Hold that breath for 3 to 5 seconds.  Breathe out through pursed lips, slowly and completely.  Rest and repeat 10 times every hour while awake.  Rest longer if you become dizzy or lightheaded.       Preoperative Brain Exercises    What are brain exercises?  A brain exercise is any activity that engages your thinking (cognitive) skills.    What types of activities are considered brain exercises?  Jigsaw puzzles, crossword puzzles, word jumble, memory games, word search, and many more.  Many can be found free online or on your phone via a mobile maite.    Why should I do brain exercises before my surgery?  More recent research has shown brain exercise before surgery can lower the risk of postoperative delirium (confusion) which can be especially important for older adults.  Patients who did brain exercises for 5 to 10 hours the days before surgery, cut their risk of postoperative delirium in half up to 1 week after surgery.    Sit-to-Stand Exercise    What is the sit-to-stand exercise?  The sit-to-stand exercise strengthens the muscles of your lower body and muscles in the center of your body (core muscles for stability) helping to maintain and improve your strength and mobility.  How do I do the sit-to-stand exercise?  The goal is to do this exercise without using your arms or hands.  If this is too difficult, use your arms and hands or a chair with armrests to help slowly push yourself to the standing position and lower yourself back to the sitting position. As the movement becomes easier use your arms and hands less.    Steps to the sit-to-stand exercise  Sit up tall in a sturdy chair, knees bent,  feet flat on the floor shoulder-width apart.  Shift your hips/pelvis forward in the chair to correctly position yourself for the next movement.  Lean forward at your hips.  Stand up straight putting equal weight on both feet.  Check to be sure you are properly aligned with the chair, in a slow controlled movement sit back down.  Repeat this exercise 10-15 times.  If needed you can do it fewer times until your strength improves.  Rest for 1 minute.  Do another 10-15 sit-to-stand exercises.  Try to do this in the morning and evening.        Instructions    Preoperative Fasting Guidelines    Why must I stop eating and drinking near surgery time?  With sedation, food or liquid in your stomach can enter your lungs causing serious complications  Food can increase nausea and vomiting  When do I need to stop eating and drinking before my surgery?      Do not eat any food or drink any liquids after midnight the night before your surgery/procedure.  You may have sips of water to take medications.            Simple things you can do to help prevent blood clots     Blood clots are blockages that can form in the body's veins. When a blood clot forms in your deep veins, it may be called a deep vein thrombosis, or DVT for short. Blood clots can happen in any part of the body where blood flows, but they are most common in the arms and legs. If a piece of a blood clot breaks free and travels to the lungs, it is called a pulmonary embolus (PE). A PE can be a very serious problem.         Being in the hospital or having surgery can raise your chances of getting a blood clot because you may not be well enough to move around as much as you normally do.         Ways you can help prevent blood clots in the hospital       Wearing SCDs  SCDs stands for Sequential Compression Devices.   SCDs are special sleeves that wrap around your legs. They attach to a pump that fills them with air to gently squeeze your legs every few minutes.  This  helps return the blood in your legs to your heart.   SCDs should only be taken off when walking or bathing. SCDs may not be comfortable, but they can help save your life.              Pump SCD leg sleeves  Wearing compression stockings - if your doctor orders them. These special snug-fitting stockings gently squeeze your legs to help blood flow.       Walking. Walking helps move the blood in your legs.   If your doctor says it is ok, try walking the halls at least   5 times a day. Ask us to help you get up, so you don't fall.      Taking any blood-thinning medicines your doctor orders.              Ways you can help prevent blood clots at home         Wearing compression stockings - if your doctor orders them.   Walking - to help move the blood in your legs.    Taking any blood-thinning medicines your doctor orders.      Signs of a blood clot or PE    Tell your doctor or nurse right away if you have any of the problems listed below.         If you are at home, seek medical care right away. Call 911 for chest pain or problems breathing.            Signs of a blood clot (DVT) - such as pain, swelling, redness, or warmth in your arm or legs.  Signs of a pulmonary embolism (PE) - such as chest pain or feeling short of breath      Tobacco and Alcohol;  Do not drink alcohol or smoke within 24 hours of surgery.  It is best to quit smoking for as long as possible before any surgery or procedure.          The Week before Surgery        Seven days before Surgery  Check your PAT medication instructions  Do the exercises provided to you by PAT  Arrange for a responsible, adult licensed  to take you home after surgery and stay with you for 24 hours.  You will not be permitted to drive yourself home if you have received any anesthetic/sedation  Six days before surgery  Check your PAT medication instructions  Do the exercises provided to you by PAT  Start using Chlorhexidene (CHG) body wash if prescribed  Five days before  surgery  Check your PAT medication instructions  Do the exercises provided to you by PAT  Continue to use CHG body wash if prescribed  Three days before surgery  Check your PAT medication instructions  Do the exercises provided to you by PAT  Continue to use CHG body wash if prescribed  Two days before surgery  Check your PAT medication instructions  Do the exercises provided to you by PAT  Continue to use CHG body wash if prescribed    The Day before Surgery       Check your PAT medication and all other PAT instructions including when to stop eating and drinking  You will be called with your arrival time for surgery in the late afternoon.  If you do not receive a call please reach out to your surgeon's office.  Do not smoke or drink 24 hours before surgery  Prepare items to bring with you to the hospital  Shower with your chlorhexidine wash if prescribed  Brush your teeth and use your chlorhexidine dental rinse if prescribed    The Day of Surgery       Check your PAT medication instructions  Ensure you follow the instructions for when to stop eating and drinking  Shower, if prescribed use CHG.  Do not apply any lotions, creams, moisturizers, perfume or deodorant  Brush your teeth and use your CHG dental rinse if prescribed  Wear loose comfortable clothing  Avoid make-up  Remove  jewelry and piercings, consider professional piercing removal with a plastic spacer if needed  Bring photo ID and Insurance card  Bring an accurate medication list that includes medication dose, frequency and allergies  Bring a copy of your advanced directives (will, health care power of )  Bring any devices and controllers as well as medical devices you have been provided with for surgery (CPAP, slings, braces, etc.)  Dentures, eyeglasses, and contacts will be removed before surgery, please bring cases for contacts or glasses

## 2024-07-23 NOTE — H&P (VIEW-ONLY)
CPM/PAT Evaluation       Name: Regina Reyez (Regina Reyez)  /Age: 1940/84 y.o.     In-Person       Chief Complaint: Evaluation prior to surgery    HPI    Past Medical History:   Diagnosis Date    Aneurysm of thoracic aorta (CMS-HCC)     4.3cm on CT     Arthritis     COPD (chronic obstructive pulmonary disease) (Multi)     Encounter for immunization 10/08/2018    Need for tetanus booster    Encounter for screening for other viral diseases 10/08/2018    Need for hepatitis C screening test    Full dentures     upper only    Heart murmur     History of acute bronchitis 10/17/2019    History of appendicitis 10/24/2019    History of cataract 2015    History of dyspnea 2019    History of influenza vaccination 10/24/2019    Hyperlipidemia     Hypertension     Murmur, cardiac     Nausea 10/18/2019    Nausea in adult    Nontoxic multinodular goiter     OAB (overactive bladder)     Perinephric fluid collection 2019    Phlebitis and thrombophlebitis of other sites 10/24/2019    Phlebitis of arm    Pulmonary emphysema (Multi)     Radial styloid tenosynovitis (de quervain) 2016    De Quervain's tenosynovitis    Right upper quadrant pain 2016    Postprandial RUQ pain    Screening for malignant neoplasms, colon     Screening for malignant neoplasms, colon    Tubulo-interstitial nephritis, not specified as acute or chronic 2019    Pyelonephritis of right kidney    Urinary incontinence        Past Surgical History:   Procedure Laterality Date    APPENDECTOMY      CATARACT EXTRACTION      COLONOSCOPY  10/08/2013    Complete Colonoscopy    OTHER SURGICAL HISTORY  2019    Tonsillectomy    OTHER SURGICAL HISTORY Right     Lumpectomy    OTHER SURGICAL HISTORY  2019    Trigger finger repair    US GUIDED THYROID BIOPSY  2019    US GUIDED THYROID BIOPSY 2019 [facility]       Patient  has no history on file for sexual activity.    Family History   Problem Relation  Name Age of Onset    Leukemia Mother  96    Alcohol abuse Father      COPD Father  72       Allergies   Allergen Reactions    Atorvastatin Myalgia       Prior to Admission medications    Medication Sig Start Date End Date Taking? Authorizing Provider   amitriptyline (Elavil) 50 mg tablet TAKE 1 TABLET DAILY AT     BEDTIME  Patient taking differently: Take 1 tablet (50 mg) by mouth once daily at bedtime. 7/16/24  Yes Magaly Merritt DO   aspirin 81 mg EC tablet Take 1 tablet (81 mg) by mouth every other day. 10/5/15  Yes Historical Provider, MD   hydroCHLOROthiazide (HYDRODiuril) 25 mg tablet Take 1 tablet (25 mg) by mouth once daily. 4/29/24  Yes Magaly Merritt DO   metoprolol succinate XL (Toprol-XL) 25 mg 24 hr tablet TAKE 1 TABLET DAILY 7/16/24  Yes Magaly Merritt DO   pravastatin (Pravachol) 20 mg tablet TAKE 1 TABLET DAILY 10/30/23  Yes Magaly Merritt DO   Skyrizi 150 mg/mL syringe syringe Inject 1 mL (150 mg) under the skin. Every 12 weeks 7/2/21   Historical Provider, MD LANTIGUA ROS:   Constitutional:   neg    Neuro/Psych:   neg    Eyes:   neg    Ears:   neg    Nose:   Mouth:   neg    Throat:   neg    Neck:   neg    Cardio:   neg    Respiratory:   neg    Endocrine:   GI:   neg    :    Urinary Issues  Musculoskeletal:   neg    Hematologic:   neg    Skin:  neg        Physical Exam  Vitals reviewed.   Constitutional:       Appearance: Normal appearance.   HENT:      Head: Normocephalic and atraumatic.      Nose: Nose normal.      Mouth/Throat:      Mouth: Mucous membranes are moist.      Pharynx: Oropharynx is clear.   Eyes:      Pupils: Pupils are equal, round, and reactive to light.   Neck:      Vascular: No carotid bruit.   Cardiovascular:      Rate and Rhythm: Normal rate and regular rhythm.      Pulses: Normal pulses.      Heart sounds: Normal heart sounds.   Pulmonary:      Effort: Pulmonary effort is normal.      Breath sounds: Normal breath sounds.   Abdominal:      Palpations: Abdomen is  soft.   Musculoskeletal:         General: Normal range of motion.      Cervical back: Normal range of motion and neck supple.   Skin:     General: Skin is warm and dry.      Capillary Refill: Capillary refill takes less than 2 seconds.   Neurological:      General: No focal deficit present.      Mental Status: She is alert and oriented to person, place, and time.   Psychiatric:         Mood and Affect: Mood normal.         Behavior: Behavior normal.         Thought Content: Thought content normal.         Judgment: Judgment normal.          PAT AIRWAY:   Airway:     Mallampati::  II    TM distance::  >3 FB    Neck ROM::  Full   Front Upper Permanent Plate      Visit Vitals  /67   Pulse 70   Temp 36.2 °C (97.2 °F) (Tympanic)   Resp 16       DASI Risk Score      Flowsheet Row Most Recent Value   DASI SCORE 34.95   METS Score (Will be calculated only when all the questions are answered) 7          Caprini DVT Assessment      Flowsheet Row Most Recent Value   DVT Score 7   Current Status Major surgery planned, including arthroscopic and laproscopic (1-2 hours)   History Prior major surgery   Age Over 75 years   BMI 30 or less          Modified Frailty Index    No data to display       CHADS2 Stroke Risk  Current as of 53 minutes ago        N/A 3 to 100%: High Risk   2 to < 3%: Medium Risk   0 to < 2%: Low Risk     Last Change: N/A          This score determines the patient's risk of having a stroke if the patient has atrial fibrillation.        This score is not applicable to this patient. Components are not calculated.          Revised Cardiac Risk Index      Flowsheet Row Most Recent Value   Revised Cardiac Risk Calculator 0          Apfel Simplified Score      Flowsheet Row Most Recent Value   Apfel Simplified Score Calculator 3          Risk Analysis Index Results This Encounter         7/23/2024  1110             PEPE Cancer History: Patient does not indicate history of cancer    Total Risk Analysis Index  Score Without Cancer: 28    Total Risk Analysis Index Score: 28          Stop Bang Score      Flowsheet Row Most Recent Value   Do you snore loudly? 0   Do you often feel tired or fatigued after your sleep? 0   Has anyone ever observed you stop breathing in your sleep? 0   Do you have or are you being treated for high blood pressure? 1   Recent BMI (Calculated) 29.4   Is BMI greater than 35 kg/m2? 0=No   Age older than 50 years old? 1=Yes   Is your neck circumference greater than 17 inches (Male) or 16 inches (Female)? 0   Gender - Male 0=No   STOP-BANG Total Score 2            Results for orders placed or performed in visit on 07/23/24 (from the past 24 hour(s))   CBC and Auto Differential   Result Value Ref Range    WBC 6.9 4.4 - 11.3 x10*3/uL    nRBC 0.0 0.0 - 0.0 /100 WBCs    RBC 4.72 4.00 - 5.20 x10*6/uL    Hemoglobin 14.2 12.0 - 16.0 g/dL    Hematocrit 43.8 36.0 - 46.0 %    MCV 93 80 - 100 fL    MCH 30.1 26.0 - 34.0 pg    MCHC 32.4 32.0 - 36.0 g/dL    RDW 13.5 11.5 - 14.5 %    Platelets 363 150 - 450 x10*3/uL    Neutrophils % 63.9 40.0 - 80.0 %    Immature Granulocytes %, Automated 0.1 0.0 - 0.9 %    Lymphocytes % 22.6 13.0 - 44.0 %    Monocytes % 8.9 2.0 - 10.0 %    Eosinophils % 3.9 0.0 - 6.0 %    Basophils % 0.6 0.0 - 2.0 %    Neutrophils Absolute 4.39 1.60 - 5.50 x10*3/uL    Immature Granulocytes Absolute, Automated 0.01 0.00 - 0.50 x10*3/uL    Lymphocytes Absolute 1.55 0.80 - 3.00 x10*3/uL    Monocytes Absolute 0.61 0.05 - 0.80 x10*3/uL    Eosinophils Absolute 0.27 0.00 - 0.40 x10*3/uL    Basophils Absolute 0.04 0.00 - 0.10 x10*3/uL        Assessment & Plan:    84 y.o.  female  scheduled for INSERTION CONTINENCE CONTROL STIMULATOR on 7/24/24 with Dr. Garcia for OAB (overactive bladder), Urge incontinence.  PMHX includes Aneurysm of thoracic aorta, COPD, HTN, HLD, murmur, nocturia.  PAT consulted for perioperative risk stratification and optimization    Neuro:  No neurologic diagnosis, however, the patient  is at increased risk for perioperative delirium secondary to  age  Patient is at increased risk for perioperative CVA secondary to  HTN, increased age    HEENT:  No HEENT diagnosis or significant findings on chart review or clinical presentation and evaluation. No further preoperative testing/intervention indicated at this time.    Cardiovascular:  Hx of HTN, Aneurysm of thoracic aorta, HLD, Hx murmur    1/25/23 CT Angio Chest  FINDINGS:  Potential study limitations:  None.     THORACIC AORTA:  The thoracic aorta is normal in course, caliber, and contour.  The sinotubular junction is  preserved.  There is no evidence for acute aortic pathology, such as dissection,  intramural hematoma, or contained rupture.  The arch vessel branching pattern is  conventional.  All of the arch  branch vessels appear widely patent in their proximal portions.  Visualized proximal abdominal aorta is normal.  The celiac trunk, SMA and bilateral renal arteries are normal in  caliber.     REPRESENTATIVE MEASUREMENTS OF THE AORTA:  Annulus  2.5 x  1.9 cm  Root (Sinus of Valsalva)  3.8 cm  Sinotubular junction  3.2 cm  Mid ascending  4.3 cm  Distal ascending  4.1 cm  Mid transverse arch  2.7 cm  Isthmus  2.7 cm  Mid descending  2.8 cm  Distal descending (hiatus)  2.4 cm     CORONARY ARTERIES:  The examination is not optimized for assessment of the coronary  arteries.  Normal coronary artery origins.  Right dominant system.  Mild coronary calcification     PULMONARY ARTERIES:  The central pulmonary arteries appear  normal (MPA-3.3 cm, RPA-3.0  cm, LPA-3.0 cm).     SYSTEMIC AND PULMONARY VEINS:  Normal systemic venous and pulmonary venous return.  The SVC and IVC are of normal caliber.  Normal pulmonary venous anatomy.     CARDIAC CHAMBERS:  Normal atrioventricular and ventriculoarterial concordance.     LEFT ATRIUM:  Normal size     RIGHT ATRIUM:  Normal size     INTERATRIAL SEPTUM:  Intact.     LEFT VENTRICLE:  Normal size     RIGHT  VENTRICLE:  Normal size     INTERVENTRICULAR SEPTUM:  Intact.     AORTIC VALVE:  The aortic valve is  trileaflet in morphology.  No calcifications.     MITRAL VALVE:  No thickening/calcification.     PERICARDIUM:  There is no pericardial effusion or thickening.     CHEST:  Trachea and central airways are patent. No endobronchial lesion.  There is no focal pulmonary mass or consolidation. No pleural  effusion or pneumothorax.  There is no significant axillary or mediastinal lymph nodes. No hilar  adenopathy.  Thyromegaly with multiple hypoattenuating nodules in keeping with  history of multinodular goiter noted on thyroid ultrasound 2021.  Esophagus is normal.     UPPER ABDOMEN:  Limited imaging through the upper abdomen reveals no abnormalities of  the visualized organs.     BONE AND SOFT TISSUE:  No suspicious osseous abnormality.     IMPRESSION:  1. Stable fusiform aneurysm of the ascending aorta measuring up to  4.3 cm in maximum diameter.  2. Aortic atherosclerosis. Mild coronary calcifications.  3. Thyromegaly in keeping with history of multi nodular goiter  METS: 7  RCRI: 0 points, 3.9%  risk for postoperative MACE   CHRISTOPHER: 0.3% risk for 30 day postoperative MACE  EKG -   5/6/24 EKG  Nsr pvcs same as last EKG 2021 [facility]    Pulmonary:  Hx of COPD    Stop Bang score is 2 placing patient at low risk for DULCE  ARISCAT: <26 points, 1.6% risk of in-hospital postoperative pulmonary complication  PRODIGY: High risk for opioid induced respiratory depression  Pulmonary education discussed, patient also provided deep breathing exercises and  educational handout    Renal:   No renal diagnosis or significant findings on chart review, clinical presentation or evaluation. No further preoperative testing/intervention is indicated at this time.      Urological/GYN  Hx OAB (overactive bladder), Urge incontinence, nocturia  UDS demonstrates detrusor activity and normal emptying     Endocrine:  No endocrine diagnosis or significant  findings on chart review or clinical presentation and evaluation. No further testing or intervention is indicated at this time.    Hematologic:  No hematologic diagnosis or significant findings on chart review or clinical presentation and evaluation. No further testing or intervention is indicated at this time.     Antiplatelet management   The patient is not currently receiving antiplatelet therapy.  Anticoagulation management  The patient is not currently receiving anticoagulation therapy. Patient provided with DVT educational handout.    Caprini Score 7, patient at High risk for perioperative DVT.  Patient provided with VTE education/handout.    Gastrointestinal:   No GI diagnosis or significant findings on chart review or clinical presentation and evaluation.   Eat-10 score 0      Infectious disease:   No infectious diagnosis or significant findings on chart review or clinical presentation and evaluation.     Musculoskeletal:   No diagnosis or significant findings on chart review or clinical presentation and evaluation.     Anesthesia/Airway:  No anesthesia complications      Medication instructions and NPO guidelines reviewed with the patient.  All questions or concerns discussed and addressed.

## 2024-07-24 ENCOUNTER — APPOINTMENT (OUTPATIENT)
Dept: RADIOLOGY | Facility: HOSPITAL | Age: 84
End: 2024-07-24
Payer: MEDICARE

## 2024-07-24 ENCOUNTER — ANESTHESIA (OUTPATIENT)
Dept: OPERATING ROOM | Facility: HOSPITAL | Age: 84
End: 2024-07-24
Payer: MEDICARE

## 2024-07-24 ENCOUNTER — HOSPITAL ENCOUNTER (OUTPATIENT)
Facility: HOSPITAL | Age: 84
Setting detail: OUTPATIENT SURGERY
Discharge: HOME | End: 2024-07-24
Attending: STUDENT IN AN ORGANIZED HEALTH CARE EDUCATION/TRAINING PROGRAM | Admitting: STUDENT IN AN ORGANIZED HEALTH CARE EDUCATION/TRAINING PROGRAM
Payer: MEDICARE

## 2024-07-24 VITALS
SYSTOLIC BLOOD PRESSURE: 136 MMHG | HEART RATE: 63 BPM | DIASTOLIC BLOOD PRESSURE: 57 MMHG | TEMPERATURE: 97.9 F | OXYGEN SATURATION: 96 % | HEIGHT: 65 IN | RESPIRATION RATE: 15 BRPM | BODY MASS INDEX: 29.27 KG/M2 | WEIGHT: 175.71 LBS

## 2024-07-24 DIAGNOSIS — N39.41 URGE INCONTINENCE: ICD-10-CM

## 2024-07-24 DIAGNOSIS — N32.81 OAB (OVERACTIVE BLADDER): Primary | ICD-10-CM

## 2024-07-24 DIAGNOSIS — G89.18 POSTOPERATIVE PAIN: ICD-10-CM

## 2024-07-24 PROCEDURE — C1889 IMPLANT/INSERT DEVICE, NOC: HCPCS | Performed by: STUDENT IN AN ORGANIZED HEALTH CARE EDUCATION/TRAINING PROGRAM

## 2024-07-24 PROCEDURE — 64561 IMPLANT NEUROELECTRODES: CPT | Performed by: STUDENT IN AN ORGANIZED HEALTH CARE EDUCATION/TRAINING PROGRAM

## 2024-07-24 PROCEDURE — 3600000009 HC OR TIME - EACH INCREMENTAL 1 MINUTE - PROCEDURE LEVEL FOUR: Performed by: STUDENT IN AN ORGANIZED HEALTH CARE EDUCATION/TRAINING PROGRAM

## 2024-07-24 PROCEDURE — 2500000005 HC RX 250 GENERAL PHARMACY W/O HCPCS

## 2024-07-24 PROCEDURE — 7100000010 HC PHASE TWO TIME - EACH INCREMENTAL 1 MINUTE: Performed by: STUDENT IN AN ORGANIZED HEALTH CARE EDUCATION/TRAINING PROGRAM

## 2024-07-24 PROCEDURE — 7100000009 HC PHASE TWO TIME - INITIAL BASE CHARGE: Performed by: STUDENT IN AN ORGANIZED HEALTH CARE EDUCATION/TRAINING PROGRAM

## 2024-07-24 PROCEDURE — A64561 PR PRQ IMPLTJ NEUROSTIM ELTRD SACRAL NRVE W/IMAGING

## 2024-07-24 PROCEDURE — A64561 PR PRQ IMPLTJ NEUROSTIM ELTRD SACRAL NRVE W/IMAGING: Performed by: STUDENT IN AN ORGANIZED HEALTH CARE EDUCATION/TRAINING PROGRAM

## 2024-07-24 PROCEDURE — 2500000004 HC RX 250 GENERAL PHARMACY W/ HCPCS (ALT 636 FOR OP/ED): Performed by: STUDENT IN AN ORGANIZED HEALTH CARE EDUCATION/TRAINING PROGRAM

## 2024-07-24 PROCEDURE — C1897 LEAD, NEUROSTIM TEST KIT: HCPCS | Performed by: STUDENT IN AN ORGANIZED HEALTH CARE EDUCATION/TRAINING PROGRAM

## 2024-07-24 PROCEDURE — 3700000001 HC GENERAL ANESTHESIA TIME - INITIAL BASE CHARGE: Performed by: STUDENT IN AN ORGANIZED HEALTH CARE EDUCATION/TRAINING PROGRAM

## 2024-07-24 PROCEDURE — 3600000004 HC OR TIME - INITIAL BASE CHARGE - PROCEDURE LEVEL FOUR: Performed by: STUDENT IN AN ORGANIZED HEALTH CARE EDUCATION/TRAINING PROGRAM

## 2024-07-24 PROCEDURE — C1778 LEAD, NEUROSTIMULATOR: HCPCS | Performed by: STUDENT IN AN ORGANIZED HEALTH CARE EDUCATION/TRAINING PROGRAM

## 2024-07-24 PROCEDURE — 76000 FLUOROSCOPY <1 HR PHYS/QHP: CPT

## 2024-07-24 PROCEDURE — 3700000002 HC GENERAL ANESTHESIA TIME - EACH INCREMENTAL 1 MINUTE: Performed by: STUDENT IN AN ORGANIZED HEALTH CARE EDUCATION/TRAINING PROGRAM

## 2024-07-24 PROCEDURE — 2720000007 HC OR 272 NO HCPCS: Performed by: STUDENT IN AN ORGANIZED HEALTH CARE EDUCATION/TRAINING PROGRAM

## 2024-07-24 PROCEDURE — 2500000004 HC RX 250 GENERAL PHARMACY W/ HCPCS (ALT 636 FOR OP/ED)

## 2024-07-24 PROCEDURE — 2500000004 HC RX 250 GENERAL PHARMACY W/ HCPCS (ALT 636 FOR OP/ED): Performed by: ANESTHESIOLOGY

## 2024-07-24 PROCEDURE — 2500000005 HC RX 250 GENERAL PHARMACY W/O HCPCS: Performed by: ANESTHESIOLOGY

## 2024-07-24 PROCEDURE — L8679 IMP NEUROSTI PLS GN ANY TYPE: HCPCS | Performed by: STUDENT IN AN ORGANIZED HEALTH CARE EDUCATION/TRAINING PROGRAM

## 2024-07-24 PROCEDURE — C1883 ADAPT/EXT, PACING/NEURO LEAD: HCPCS | Performed by: STUDENT IN AN ORGANIZED HEALTH CARE EDUCATION/TRAINING PROGRAM

## 2024-07-24 PROCEDURE — 2500000005 HC RX 250 GENERAL PHARMACY W/O HCPCS: Performed by: STUDENT IN AN ORGANIZED HEALTH CARE EDUCATION/TRAINING PROGRAM

## 2024-07-24 PROCEDURE — 2780000003 HC OR 278 NO HCPCS: Performed by: STUDENT IN AN ORGANIZED HEALTH CARE EDUCATION/TRAINING PROGRAM

## 2024-07-24 PROCEDURE — 99100 ANES PT EXTEME AGE<1 YR&>70: CPT | Performed by: STUDENT IN AN ORGANIZED HEALTH CARE EDUCATION/TRAINING PROGRAM

## 2024-07-24 PROCEDURE — C1767 GENERATOR, NEURO NON-RECHARG: HCPCS | Performed by: STUDENT IN AN ORGANIZED HEALTH CARE EDUCATION/TRAINING PROGRAM

## 2024-07-24 DEVICE — KIT, TINED LEAD: Type: IMPLANTABLE DEVICE | Site: SACRUM | Status: FUNCTIONAL

## 2024-07-24 DEVICE — KIT, LEAD IMPLANT: Type: IMPLANTABLE DEVICE | Site: SACRUM | Status: NON-FUNCTIONAL

## 2024-07-24 RX ORDER — TRAMADOL HYDROCHLORIDE 50 MG/1
50 TABLET ORAL EVERY 8 HOURS PRN
Qty: 10 TABLET | Refills: 0 | Status: SHIPPED | OUTPATIENT
Start: 2024-07-24

## 2024-07-24 RX ORDER — CEFAZOLIN SODIUM 2 G/100ML
2 INJECTION, SOLUTION INTRAVENOUS ONCE
Status: DISCONTINUED | OUTPATIENT
Start: 2024-07-24 | End: 2024-07-24 | Stop reason: HOSPADM

## 2024-07-24 RX ORDER — CEPHALEXIN 250 MG/1
250 CAPSULE ORAL DAILY
Qty: 14 CAPSULE | Refills: 0 | Status: SHIPPED | OUTPATIENT
Start: 2024-07-24 | End: 2024-08-07

## 2024-07-24 RX ORDER — ACETAMINOPHEN 325 MG/1
650 TABLET ORAL EVERY 4 HOURS PRN
Status: DISCONTINUED | OUTPATIENT
Start: 2024-07-24 | End: 2024-07-24 | Stop reason: HOSPADM

## 2024-07-24 RX ORDER — VANCOMYCIN HYDROCHLORIDE 1 G/20ML
INJECTION, POWDER, LYOPHILIZED, FOR SOLUTION INTRAVENOUS AS NEEDED
Status: DISCONTINUED | OUTPATIENT
Start: 2024-07-24 | End: 2024-07-24 | Stop reason: HOSPADM

## 2024-07-24 RX ORDER — PHENYLEPHRINE HCL IN 0.9% NACL 0.4MG/10ML
SYRINGE (ML) INTRAVENOUS AS NEEDED
Status: DISCONTINUED | OUTPATIENT
Start: 2024-07-24 | End: 2024-07-24

## 2024-07-24 RX ORDER — POVIDONE-IODINE 10 %
SOLUTION, NON-ORAL TOPICAL AS NEEDED
Status: DISCONTINUED | OUTPATIENT
Start: 2024-07-24 | End: 2024-07-24 | Stop reason: HOSPADM

## 2024-07-24 RX ORDER — DOCUSATE SODIUM 100 MG/1
100 CAPSULE, LIQUID FILLED ORAL 2 TIMES DAILY
Qty: 60 CAPSULE | Refills: 0 | Status: SHIPPED | OUTPATIENT
Start: 2024-07-24 | End: 2024-08-23

## 2024-07-24 RX ORDER — POLYETHYLENE GLYCOL 3350 17 G/17G
17 POWDER, FOR SOLUTION ORAL DAILY
Qty: 510 G | Refills: 0 | Status: SHIPPED | OUTPATIENT
Start: 2024-07-24 | End: 2024-08-23

## 2024-07-24 RX ORDER — SODIUM CHLORIDE, SODIUM LACTATE, POTASSIUM CHLORIDE, CALCIUM CHLORIDE 600; 310; 30; 20 MG/100ML; MG/100ML; MG/100ML; MG/100ML
100 INJECTION, SOLUTION INTRAVENOUS CONTINUOUS
Status: DISCONTINUED | OUTPATIENT
Start: 2024-07-24 | End: 2024-07-24 | Stop reason: HOSPADM

## 2024-07-24 RX ORDER — CEFAZOLIN 1 G/1
INJECTION, POWDER, FOR SOLUTION INTRAVENOUS AS NEEDED
Status: DISCONTINUED | OUTPATIENT
Start: 2024-07-24 | End: 2024-07-24

## 2024-07-24 RX ORDER — ONDANSETRON HYDROCHLORIDE 2 MG/ML
INJECTION, SOLUTION INTRAVENOUS AS NEEDED
Status: DISCONTINUED | OUTPATIENT
Start: 2024-07-24 | End: 2024-07-24

## 2024-07-24 RX ORDER — PROPOFOL 10 MG/ML
INJECTION, EMULSION INTRAVENOUS CONTINUOUS PRN
Status: DISCONTINUED | OUTPATIENT
Start: 2024-07-24 | End: 2024-07-24

## 2024-07-24 RX ORDER — ALBUTEROL SULFATE 0.83 MG/ML
2.5 SOLUTION RESPIRATORY (INHALATION) ONCE AS NEEDED
Status: DISCONTINUED | OUTPATIENT
Start: 2024-07-24 | End: 2024-07-24 | Stop reason: HOSPADM

## 2024-07-24 RX ORDER — ONDANSETRON HYDROCHLORIDE 2 MG/ML
8 INJECTION, SOLUTION INTRAVENOUS ONCE
Status: DISCONTINUED | OUTPATIENT
Start: 2024-07-24 | End: 2024-07-24 | Stop reason: HOSPADM

## 2024-07-24 RX ORDER — LIDOCAINE HCL/PF 100 MG/5ML
SYRINGE (ML) INTRAVENOUS AS NEEDED
Status: DISCONTINUED | OUTPATIENT
Start: 2024-07-24 | End: 2024-07-24

## 2024-07-24 RX ORDER — LIDOCAINE HYDROCHLORIDE AND EPINEPHRINE 10; 10 MG/ML; UG/ML
INJECTION, SOLUTION INFILTRATION; PERINEURAL AS NEEDED
Status: DISCONTINUED | OUTPATIENT
Start: 2024-07-24 | End: 2024-07-24 | Stop reason: HOSPADM

## 2024-07-24 ASSESSMENT — COLUMBIA-SUICIDE SEVERITY RATING SCALE - C-SSRS
1. IN THE PAST MONTH, HAVE YOU WISHED YOU WERE DEAD OR WISHED YOU COULD GO TO SLEEP AND NOT WAKE UP?: NO
6. HAVE YOU EVER DONE ANYTHING, STARTED TO DO ANYTHING, OR PREPARED TO DO ANYTHING TO END YOUR LIFE?: NO
2. HAVE YOU ACTUALLY HAD ANY THOUGHTS OF KILLING YOURSELF?: NO

## 2024-07-24 ASSESSMENT — PAIN SCALES - GENERAL
PAINLEVEL_OUTOF10: 0 - NO PAIN

## 2024-07-24 ASSESSMENT — PAIN - FUNCTIONAL ASSESSMENT
PAIN_FUNCTIONAL_ASSESSMENT: 0-10
PAIN_FUNCTIONAL_ASSESSMENT: 0-10

## 2024-07-24 NOTE — ANESTHESIA POSTPROCEDURE EVALUATION
Patient: Regina Reyez    Procedure Summary       Date: 07/24/24 Room / Location: [facility] 05 / [facility]    Anesthesia Start: 1225 Anesthesia Stop: 1326    Procedure: STAGE 1 STIMULATOR PLACEMENT Diagnosis:       OAB (overactive bladder)      (OAB (overactive bladder) [N32.81])    Surgeons: Reed Garcia MD Responsible Provider: Caryl Dia MD    Anesthesia Type: MAC ASA Status: 3            Anesthesia Type: MAC    Vitals Value Taken Time   /59 07/24/24 1326   Temp 36 07/24/24 1326   Pulse 67 07/24/24 1326   Resp 14 07/24/24 1326   SpO2 95 07/24/24 1326       Anesthesia Post Evaluation    Patient location during evaluation: PACU  Patient participation: complete - patient participated  Level of consciousness: awake and alert  Pain management: satisfactory to patient  Airway patency: patent  Two or more strategies used to mitigate risk of obstructive sleep apnea  Cardiovascular status: acceptable and blood pressure returned to baseline  Respiratory status: acceptable and face mask  Hydration status: acceptable  Postoperative Nausea and Vomiting: none      There were no known notable events for this encounter.

## 2024-07-24 NOTE — OP NOTE
STAGE 1 STIMULATOR PLACEMENT Operative Note     Date: 2024  OR Location: [facility]    Name: Regina Reyez, : 1940, Age: 84 y.o., MRN: [id number], Sex: female    Diagnosis  Pre-op Diagnosis      * OAB (overactive bladder) [N32.81] Post-op Diagnosis     * OAB (overactive bladder) [N32.81]     Procedures  STAGE 1 STIMULATOR PLACEMENT  [id number] - ME PRQ IMPLTJ NEUROSTIM ELTRD SACRAL NRVE W/IMAGING      Surgeons      * Reed Garcia - Primary    Resident/Fellow/Other Assistant:  Surgeons and Role:  * No surgeons found with a matching role *    Procedure Summary  Anesthesia: Monitor Anesthesia Care  ASA: III  Anesthesia Staff: Anesthesiologist: Caryl Dia MD  CRNA: LUIZA Balbuena-CRNA  Estimated Blood Loss:  1 mL  Intra-op Medications: Administrations occurring from 1030 to 1130 on 24:  * No intraprocedure medications in log *           Anesthesia Record               Intraprocedure I/O Totals          Intake    Dexmedetomidine 0.00 mL    The total shown is the total volume documented since Anesthesia Start was filed.    lactated Ringer's infusion 900.00 mL    Total Intake 900 mL          Specimen: No specimens collected     Staff:   Circulator: Rhea Ratliffub Person: Dee Parikh Person: Subha  Circulator: Марина         Drains and/or Catheters: * None in log *    Tourniquet Times:         Implants:  Implants       Type Name Action Serial No.      Neuro Interventional Implant KIT, LEAD IMPLANT - [id number] Used, Not Implanted      Neuro Interventional Implant KIT, TINED LEAD - [id number] - [id number] Implanted [id number]              Findings: <2.0 mA on all 4 electrodes    Indications: Regina Reyez is an 84 y.o. female who is having surgery for OAB (overactive bladder) [N32.81].     The patient was seen in the preoperative area. The risks, benefits, complications, treatment options, non-operative alternatives, expected recovery and outcomes were discussed with the patient. The  possibilities of reaction to medication, pulmonary aspiration, injury to surrounding structures, bleeding, recurrent infection, the need for additional procedures, failure to diagnose a condition, and creating a complication requiring transfusion or operation were discussed with the patient. The patient concurred with the proposed plan, giving informed consent.  The site of surgery was properly noted/marked if necessary per policy. The patient has been actively warmed in preoperative area. Preoperative antibiotics have been ordered and given within 1 hours of incision. Venous thrombosis prophylaxis have been ordered including bilateral sequential compression devices    Procedure Details: Under satisfactory intravenous sedation, in the prone position, the patient was prepped and draped in the usual sterile manner. Bony landmarks were used to identify the approximate location of the S3 foramen on the right. A spinal needle was used to find the correct spot for the S3 nerve after injecting local anesthetic over the site. Once the site was confirmed using sensory and motor response, as well as with intraoperative fluoroscopy, the lead was placed into the S3 foramen using the kit designed for this purpose. The lead was then tunneled over to the right side where a small pocket was created. The temporary lead extender was then connected and passed across and above the incision on the same side and exteriorized in the right buttock region. The pocket was irrigated with a dilute solution of Gentamycin. The subcutaneous tissue was reapproximated with 2-0 Vicryl. The skin was closed with a 4-0 subcuticular stitch. A simple stitch was placed over the midline site at the lead insertion. Sponge and instrument counts were correct.    Complications:  None; patient tolerated the procedure well.    Disposition: PACU - hemodynamically stable.  Condition: stable         Additional Details:     Attending Attestation: I was present and  scrubbed for the entire procedure.    Reed Garcia  Phone Number: [phone]

## 2024-07-24 NOTE — ANESTHESIA PREPROCEDURE EVALUATION
Patient: Regina Reyez    Procedure Information       Date/Time: 07/24/24 1030    Procedure: STAGE 1 STIMULATOR PLACEMENT - research study, first available and ASAP    Location: [facility] OR  / [facility]    Surgeons: Reed Garcia MD            Relevant Problems   Cardiac   (+) Essential hypertension   (+) Heart murmur   (+) Hyperlipidemia   (+) Thoracic aortic aneurysm without rupture (CMS-HCC)      Pulmonary   (+) COPD, moderate (Multi)   (+) Pulmonary emphysema (Multi)      Endocrine   (+) Nontoxic multinodular goiter      HEENT   (+) Asymmetrical sensorineural hearing loss   (+) Sensorineural hearing loss, bilateral      ID   (+) COVID-19 virus infection      Skin   (+) Eczema of external auditory canal       Clinical information reviewed:   Tobacco  Allergies  Meds   Med Hx  Surg Hx   Fam Hx  Soc Hx        NPO Detail:  NPO/Void Status  Date of Last Liquid: 07/23/24  Date of Last Solid: 07/23/24         Physical Exam    Airway  Mallampati: III  TM distance: >3 FB  Neck ROM: full     Cardiovascular   Rhythm: regular  Rate: normal     Dental   Comments: Has plate upper teeth --- will leave it in    Pulmonary - normal exam     Abdominal            Anesthesia Plan    History of general anesthesia?: yes  History of complications of general anesthesia?: no    ASA 3     MAC     intravenous induction   Postoperative administration of opioids is intended.  Trial extubation is planned.  Anesthetic plan and risks discussed with patient.  Use of blood products discussed with patient who consented to blood products.    Plan discussed with CRNA and attending.

## 2024-07-29 ENCOUNTER — TELEPHONE (OUTPATIENT)
Dept: UROLOGY | Facility: CLINIC | Age: 84
End: 2024-07-29
Payer: MEDICARE

## 2024-07-29 NOTE — TELEPHONE ENCOUNTER
Spoke with pt about virtual visit. Advised pt that Dr Garcia will be able to do phone call. Pt stated understanding.

## 2024-07-29 NOTE — TELEPHONE ENCOUNTER
Pt called in stating that she had an [employer] device placed last Wednesday. Pt stated that she is to have a virtual follow up with Dr Garcia on 8/2/24. Pt stated that she will not be at home at this time and cannot do a virtual visit but can do a phone call to her cell. Pt would like to know if this is ok. Pt requested a call back.

## 2024-07-31 ENCOUNTER — ANESTHESIA EVENT (OUTPATIENT)
Dept: OPERATING ROOM | Facility: HOSPITAL | Age: 84
End: 2024-07-31
Payer: MEDICARE

## 2024-08-02 ENCOUNTER — APPOINTMENT (OUTPATIENT)
Dept: UROLOGY | Facility: CLINIC | Age: 84
End: 2024-08-02
Payer: MEDICARE

## 2024-08-02 DIAGNOSIS — N32.81 OAB (OVERACTIVE BLADDER): Primary | ICD-10-CM

## 2024-08-02 PROCEDURE — 99442 PR PHYS/QHP TELEPHONE EVALUATION 11-20 MIN: CPT | Performed by: STUDENT IN AN ORGANIZED HEALTH CARE EDUCATION/TRAINING PROGRAM

## 2024-08-02 NOTE — PROGRESS NOTES
Virtual or Telephone Consent    A telephone visit (audio only) between the patient (at the originating site) and the provider (at the distant site) was utilized to provide this telehealth service.   Verbal consent was requested and obtained from Regina Reyez on this date, 08/05/24 for a telehealth visit.     HISTORY OF PRESENT ILLNESS:  Regina Reyez is a 84 y.o. female who presents today for a virtual follow up visit. She is status post stage 1 stimulator placement on 7/24/24. She is doing well. She has no complaints or concerns at this time.           Past Medical History  She has a past medical history of Aneurysm of thoracic aorta (CMS-HCC), Arthritis, COPD (chronic obstructive pulmonary disease) (Multi), Encounter for immunization (10/08/2018), Encounter for screening for other viral diseases (10/08/2018), Full dentures, Heart murmur, History of acute bronchitis (10/17/2019), History of appendicitis (10/24/2019), History of cataract (04/02/2015), History of dyspnea (08/19/2019), History of influenza vaccination (10/24/2019), Hyperlipidemia, Hypertension, Murmur, cardiac, Nausea (10/18/2019), Nontoxic multinodular goiter, OAB (overactive bladder), Perinephric fluid collection (07/29/2019), Phlebitis and thrombophlebitis of other sites (10/24/2019), Pulmonary emphysema (Multi), Radial styloid tenosynovitis (de quervain) (04/12/2016), Right upper quadrant pain (04/08/2016), Screening for malignant neoplasms, colon, Tubulo-interstitial nephritis, 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    Surgical History  She has a past surgical history that includes Tonsillectomy (09/20/2019); Breast lumpectomy (Right); Trigger finger release (09/20/2019); Colonoscopy (10/08/2013); US guided thyroid biopsy (08/06/2019); Appendectomy; Cataract extraction; Thumb arthroscopy (Right); and Bladder surgery (07/24/2024).     Social History  She reports that she quit smoking about 40 years ago. Her  smoking use included cigarettes. She has never used smokeless tobacco. She reports that she does not currently use alcohol. She reports that she does not use drugs.    Family History  Family History   Problem Relation Name Age of Onset    Leukemia Mother  96    Alcohol abuse Father      COPD Father  72        Allergies  Atorvastatin      A comprehensive 10+ review of systems was negative except for: see hpi                         Assessment:  83-year-old with urinary urge incontinence, overactive bladder and nocturia     OAB/Nocturia:  On Gemtesa, continue taking for now, this is somewhat helpful but wants more definitive management  UDS: normal emptying no stress incontinence  S/P stage 1 stimulator placement on 7/24/24, doing well, >90%  improvement, proceed with stage 2           I spent a total of 11 minutes speaking with the patient on the telephone    All questions and concerns were answered and addressed.  The patient expressed understanding and agrees with the plan.     Reed Garcia MD    Scribe Attestation  By signing my name below, IMagdalena, Scribe   attest that this documentation has been prepared under the direction and in the presence of Reed Garcia MD.

## 2024-08-05 ENCOUNTER — ANESTHESIA (OUTPATIENT)
Dept: OPERATING ROOM | Facility: HOSPITAL | Age: 84
End: 2024-08-05
Payer: MEDICARE

## 2024-08-05 ENCOUNTER — HOSPITAL ENCOUNTER (OUTPATIENT)
Facility: HOSPITAL | Age: 84
Setting detail: OUTPATIENT SURGERY
Discharge: HOME | End: 2024-08-05
Attending: STUDENT IN AN ORGANIZED HEALTH CARE EDUCATION/TRAINING PROGRAM | Admitting: STUDENT IN AN ORGANIZED HEALTH CARE EDUCATION/TRAINING PROGRAM
Payer: MEDICARE

## 2024-08-05 ENCOUNTER — PHARMACY VISIT (OUTPATIENT)
Dept: PHARMACY | Facility: CLINIC | Age: 84
End: 2024-08-05
Payer: COMMERCIAL

## 2024-08-05 VITALS
RESPIRATION RATE: 16 BRPM | OXYGEN SATURATION: 97 % | HEIGHT: 66 IN | HEART RATE: 59 BPM | SYSTOLIC BLOOD PRESSURE: 130 MMHG | BODY MASS INDEX: 27.99 KG/M2 | WEIGHT: 174.16 LBS | DIASTOLIC BLOOD PRESSURE: 69 MMHG | TEMPERATURE: 96.8 F

## 2024-08-05 DIAGNOSIS — N32.81 OAB (OVERACTIVE BLADDER): ICD-10-CM

## 2024-08-05 DIAGNOSIS — G89.18 POST-OP PAIN: ICD-10-CM

## 2024-08-05 DIAGNOSIS — N39.41 URGE INCONTINENCE: Primary | ICD-10-CM

## 2024-08-05 PROBLEM — F41.9 ANXIETY: Status: ACTIVE | Noted: 2024-08-05

## 2024-08-05 PROCEDURE — A64590 PR IMPLANT PERIPH/GASTRIC NEUROSTIM/RECEIVER: Performed by: ANESTHESIOLOGY

## 2024-08-05 PROCEDURE — 2720000007 HC OR 272 NO HCPCS: Performed by: STUDENT IN AN ORGANIZED HEALTH CARE EDUCATION/TRAINING PROGRAM

## 2024-08-05 PROCEDURE — 2780000003 HC OR 278 NO HCPCS: Performed by: STUDENT IN AN ORGANIZED HEALTH CARE EDUCATION/TRAINING PROGRAM

## 2024-08-05 PROCEDURE — 7100000009 HC PHASE TWO TIME - INITIAL BASE CHARGE: Performed by: STUDENT IN AN ORGANIZED HEALTH CARE EDUCATION/TRAINING PROGRAM

## 2024-08-05 PROCEDURE — 3700000001 HC GENERAL ANESTHESIA TIME - INITIAL BASE CHARGE: Performed by: STUDENT IN AN ORGANIZED HEALTH CARE EDUCATION/TRAINING PROGRAM

## 2024-08-05 PROCEDURE — 99100 ANES PT EXTEME AGE<1 YR&>70: CPT | Performed by: ANESTHESIOLOGY

## 2024-08-05 PROCEDURE — C1767 GENERATOR, NEURO NON-RECHARG: HCPCS | Performed by: STUDENT IN AN ORGANIZED HEALTH CARE EDUCATION/TRAINING PROGRAM

## 2024-08-05 PROCEDURE — 2500000004 HC RX 250 GENERAL PHARMACY W/ HCPCS (ALT 636 FOR OP/ED): Performed by: STUDENT IN AN ORGANIZED HEALTH CARE EDUCATION/TRAINING PROGRAM

## 2024-08-05 PROCEDURE — 3600000004 HC OR TIME - INITIAL BASE CHARGE - PROCEDURE LEVEL FOUR: Performed by: STUDENT IN AN ORGANIZED HEALTH CARE EDUCATION/TRAINING PROGRAM

## 2024-08-05 PROCEDURE — 2500000005 HC RX 250 GENERAL PHARMACY W/O HCPCS: Performed by: STUDENT IN AN ORGANIZED HEALTH CARE EDUCATION/TRAINING PROGRAM

## 2024-08-05 PROCEDURE — 7100000002 HC RECOVERY ROOM TIME - EACH INCREMENTAL 1 MINUTE: Performed by: STUDENT IN AN ORGANIZED HEALTH CARE EDUCATION/TRAINING PROGRAM

## 2024-08-05 PROCEDURE — 7100000001 HC RECOVERY ROOM TIME - INITIAL BASE CHARGE: Performed by: STUDENT IN AN ORGANIZED HEALTH CARE EDUCATION/TRAINING PROGRAM

## 2024-08-05 PROCEDURE — 2500000004 HC RX 250 GENERAL PHARMACY W/ HCPCS (ALT 636 FOR OP/ED): Performed by: NURSE ANESTHETIST, CERTIFIED REGISTERED

## 2024-08-05 PROCEDURE — 2500000004 HC RX 250 GENERAL PHARMACY W/ HCPCS (ALT 636 FOR OP/ED): Performed by: ANESTHESIOLOGY

## 2024-08-05 PROCEDURE — RXMED WILLOW AMBULATORY MEDICATION CHARGE

## 2024-08-05 PROCEDURE — 7100000010 HC PHASE TWO TIME - EACH INCREMENTAL 1 MINUTE: Performed by: STUDENT IN AN ORGANIZED HEALTH CARE EDUCATION/TRAINING PROGRAM

## 2024-08-05 PROCEDURE — 3700000002 HC GENERAL ANESTHESIA TIME - EACH INCREMENTAL 1 MINUTE: Performed by: STUDENT IN AN ORGANIZED HEALTH CARE EDUCATION/TRAINING PROGRAM

## 2024-08-05 PROCEDURE — A64590 PR IMPLANT PERIPH/GASTRIC NEUROSTIM/RECEIVER: Performed by: NURSE ANESTHETIST, CERTIFIED REGISTERED

## 2024-08-05 PROCEDURE — 64590 INS/RPL PRPH SAC/GSTR NPG/R: CPT | Performed by: STUDENT IN AN ORGANIZED HEALTH CARE EDUCATION/TRAINING PROGRAM

## 2024-08-05 PROCEDURE — 2500000005 HC RX 250 GENERAL PHARMACY W/O HCPCS: Performed by: NURSE ANESTHETIST, CERTIFIED REGISTERED

## 2024-08-05 PROCEDURE — 3600000009 HC OR TIME - EACH INCREMENTAL 1 MINUTE - PROCEDURE LEVEL FOUR: Performed by: STUDENT IN AN ORGANIZED HEALTH CARE EDUCATION/TRAINING PROGRAM

## 2024-08-05 DEVICE — NEUROSTIMULATOR, F15: Type: IMPLANTABLE DEVICE | Site: SACRUM | Status: FUNCTIONAL

## 2024-08-05 RX ORDER — SODIUM CHLORIDE, SODIUM LACTATE, POTASSIUM CHLORIDE, CALCIUM CHLORIDE 600; 310; 30; 20 MG/100ML; MG/100ML; MG/100ML; MG/100ML
100 INJECTION, SOLUTION INTRAVENOUS CONTINUOUS
Status: DISCONTINUED | OUTPATIENT
Start: 2024-08-05 | End: 2024-08-05 | Stop reason: HOSPADM

## 2024-08-05 RX ORDER — DROPERIDOL 2.5 MG/ML
0.62 INJECTION, SOLUTION INTRAMUSCULAR; INTRAVENOUS ONCE AS NEEDED
Status: DISCONTINUED | OUTPATIENT
Start: 2024-08-05 | End: 2024-08-05 | Stop reason: HOSPADM

## 2024-08-05 RX ORDER — PROPOFOL 10 MG/ML
INJECTION, EMULSION INTRAVENOUS CONTINUOUS PRN
Status: DISCONTINUED | OUTPATIENT
Start: 2024-08-05 | End: 2024-08-05

## 2024-08-05 RX ORDER — TRAMADOL HYDROCHLORIDE 50 MG/1
50 TABLET ORAL EVERY 6 HOURS PRN
Qty: 20 TABLET | Refills: 0 | Status: SHIPPED | OUTPATIENT
Start: 2024-08-05 | End: 2024-08-10

## 2024-08-05 RX ORDER — POVIDONE-IODINE 10 %
SOLUTION, NON-ORAL TOPICAL AS NEEDED
Status: DISCONTINUED | OUTPATIENT
Start: 2024-08-05 | End: 2024-08-05 | Stop reason: HOSPADM

## 2024-08-05 RX ORDER — VANCOMYCIN HYDROCHLORIDE 1 G/20ML
INJECTION, POWDER, LYOPHILIZED, FOR SOLUTION INTRAVENOUS AS NEEDED
Status: DISCONTINUED | OUTPATIENT
Start: 2024-08-05 | End: 2024-08-05 | Stop reason: HOSPADM

## 2024-08-05 RX ORDER — KETOROLAC TROMETHAMINE 10 MG/1
10 TABLET, FILM COATED ORAL EVERY 6 HOURS PRN
Qty: 20 TABLET | Refills: 0 | Status: SHIPPED | OUTPATIENT
Start: 2024-08-05 | End: 2024-09-04

## 2024-08-05 RX ORDER — LIDOCAINE HYDROCHLORIDE AND EPINEPHRINE 10; 10 MG/ML; UG/ML
INJECTION, SOLUTION INFILTRATION; PERINEURAL AS NEEDED
Status: DISCONTINUED | OUTPATIENT
Start: 2024-08-05 | End: 2024-08-05 | Stop reason: HOSPADM

## 2024-08-05 RX ORDER — FENTANYL CITRATE 50 UG/ML
INJECTION, SOLUTION INTRAMUSCULAR; INTRAVENOUS AS NEEDED
Status: DISCONTINUED | OUTPATIENT
Start: 2024-08-05 | End: 2024-08-05

## 2024-08-05 RX ORDER — CEPHALEXIN 250 MG/1
250 CAPSULE ORAL DAILY
Qty: 14 CAPSULE | Refills: 0 | Status: SHIPPED | OUTPATIENT
Start: 2024-08-05 | End: 2024-08-19

## 2024-08-05 RX ORDER — ONDANSETRON HYDROCHLORIDE 2 MG/ML
INJECTION, SOLUTION INTRAVENOUS AS NEEDED
Status: DISCONTINUED | OUTPATIENT
Start: 2024-08-05 | End: 2024-08-05

## 2024-08-05 RX ORDER — LIDOCAINE HCL/PF 100 MG/5ML
SYRINGE (ML) INTRAVENOUS AS NEEDED
Status: DISCONTINUED | OUTPATIENT
Start: 2024-08-05 | End: 2024-08-05

## 2024-08-05 RX ORDER — ACETAMINOPHEN 500 MG
1000 TABLET ORAL EVERY 6 HOURS PRN
Qty: 20 TABLET | Refills: 0 | Status: SHIPPED | OUTPATIENT
Start: 2024-08-05 | End: 2024-08-10

## 2024-08-05 RX ORDER — CEFAZOLIN 1 G/1
INJECTION, POWDER, FOR SOLUTION INTRAVENOUS AS NEEDED
Status: DISCONTINUED | OUTPATIENT
Start: 2024-08-05 | End: 2024-08-05

## 2024-08-05 RX ORDER — CEFAZOLIN SODIUM 2 G/100ML
2 INJECTION, SOLUTION INTRAVENOUS ONCE
Status: DISCONTINUED | OUTPATIENT
Start: 2024-08-05 | End: 2024-08-05 | Stop reason: HOSPADM

## 2024-08-05 RX ORDER — ONDANSETRON HYDROCHLORIDE 2 MG/ML
4 INJECTION, SOLUTION INTRAVENOUS ONCE AS NEEDED
Status: DISCONTINUED | OUTPATIENT
Start: 2024-08-05 | End: 2024-08-05 | Stop reason: HOSPADM

## 2024-08-05 RX ORDER — OXYCODONE HYDROCHLORIDE 5 MG/1
5 TABLET ORAL EVERY 4 HOURS PRN
Status: DISCONTINUED | OUTPATIENT
Start: 2024-08-05 | End: 2024-08-05 | Stop reason: HOSPADM

## 2024-08-05 SDOH — HEALTH STABILITY: MENTAL HEALTH: CURRENT SMOKER: 0

## 2024-08-05 ASSESSMENT — COLUMBIA-SUICIDE SEVERITY RATING SCALE - C-SSRS
6. HAVE YOU EVER DONE ANYTHING, STARTED TO DO ANYTHING, OR PREPARED TO DO ANYTHING TO END YOUR LIFE?: NO
1. IN THE PAST MONTH, HAVE YOU WISHED YOU WERE DEAD OR WISHED YOU COULD GO TO SLEEP AND NOT WAKE UP?: NO
2. HAVE YOU ACTUALLY HAD ANY THOUGHTS OF KILLING YOURSELF?: NO

## 2024-08-05 ASSESSMENT — PAIN SCALES - GENERAL
PAINLEVEL_OUTOF10: 0 - NO PAIN
PAIN_LEVEL: 0

## 2024-08-05 ASSESSMENT — PAIN - FUNCTIONAL ASSESSMENT
PAIN_FUNCTIONAL_ASSESSMENT: 0-10

## 2024-08-05 NOTE — ANESTHESIA POSTPROCEDURE EVALUATION
Patient: Regina Reyez    Procedure Summary       Date: 08/05/24 Room / Location: [facility] OR 07 / [facility]    Anesthesia Start: 1211 Anesthesia Stop: 1246    Procedure: STAGE 2 AXONICS Diagnosis:       OAB (overactive bladder)      Urge incontinence      (OAB (overactive bladder) [N32.81])      (Urge incontinence [N39.41])    Surgeons: Reed Garcia MD Responsible Provider: Daniel Ndiaye MD    Anesthesia Type: MAC ASA Status: 3            Anesthesia Type: MAC    Vitals Value Taken Time   /69 08/05/24 1312   Temp 36 °C (96.8 °F) 08/05/24 1242   Pulse 59 08/05/24 1312   Resp 16 08/05/24 1312   SpO2 97 % 08/05/24 1312       Anesthesia Post Evaluation    Patient location during evaluation: PACU  Patient participation: complete - patient participated  Level of consciousness: awake  Pain score: 0  Pain management: adequate  Airway patency: patent  Cardiovascular status: acceptable  Respiratory status: acceptable  Hydration status: acceptable  Postoperative Nausea and Vomiting: none        No notable events documented.

## 2024-08-05 NOTE — OP NOTE
STAGE 2 AXONICS Operative Note     Date: 2024  OR Location: [facility] OR    Name: Regina Reyez, : 1940, Age: 84 y.o., MRN: [id number], Sex: female    Diagnosis  Pre-op Diagnosis      * OAB (overactive bladder) [N32.81]     * Urge incontinence [N39.41] Post-op Diagnosis     * OAB (overactive bladder) [N32.81]     * Urge incontinence [N39.41]     Procedures  STAGE 2 AXONICS  62496 - ID INS/RPLC PERPH SAC/GSTRC NPG/RCVR PCKT CRTJ&CONN      Surgeons      * Rede Garcia - Primary    Resident/Fellow/Other Assistant:  Surgeons and Role:     * Breanna Heller PA-C - YAMILE[facility] First Assist    Procedure Summary  Anesthesia: Monitor Anesthesia Care  ASA: III  Anesthesia Staff: Anesthesiologist: Daniel Ndiaye MD  CRNA: LUIZA Ness-CRNA  Estimated Blood Loss: 1 mL  Intra-op Medications:   Administrations occurring from 1230 to 1330 on 24:   Medication Name Total Dose   lidocaine-epinephrine (Xylocaine W/EPI) 1 %-1:100,000 injection 7 mL   vancomycin (Vancocin) vial for injection 0.5 mg              Anesthesia Record               Intraprocedure I/O Totals          Intake    Dexmedetomidine 0.00 mL    The total shown is the total volume documented since Anesthesia Start was filed.    Total Intake 0 mL          Specimen: No specimens collected     Staff:   Relief Circulator: Suzi  Scrub Person: Pilo  Circulator: Rhea  Relief Scrub: Suzi  Circulator: Emerald         Drains and/or Catheters: * None in log *    Tourniquet Times:         Implants:  Implants       Type Name Action Serial No.      Neuro Interventional Implant NEUROSTIMULATOR, F15 - [id number] - [id number] Implanted [id number]              Findings: no impedence    Indications: Regina Reyez is an 84 y.o. female who is having surgery for OAB (overactive bladder) [N32.81]  Urge incontinence [N39.41].     The patient was seen in the preoperative area. The risks, benefits, complications, treatment options, non-operative alternatives, expected recovery  and outcomes were discussed with the patient. The possibilities of reaction to medication, pulmonary aspiration, injury to surrounding structures, bleeding, recurrent infection, the need for additional procedures, failure to diagnose a condition, and creating a complication requiring transfusion or operation were discussed with the patient. The patient concurred with the proposed plan, giving informed consent.  The site of surgery was properly noted/marked if necessary per policy. The patient has been actively warmed in preoperative area. Preoperative antibiotics have been ordered and given within 1 hours of incision. Venous thrombosis prophylaxis have been ordered including bilateral sequential compression devices    Procedure Details: Under satisfactory intravenous sedation, in the prone position, the patient was prepped and draped in the usual sterile manner. The test stimulator box was disconnected and amplitude was turned to the off position. The percutaneous lead extender was disconnected and discarded. Local injection was given over the prior injection. The prior incision was opened with a scalpel and carried down through the subcutaneous tissue layers with care not to disturb the devices. The suture on the boot was cut. The boot was removed from the lead, exposing the screws. The screws were loosened with the wrench. The lead was retracted away from the connector block. The boot was then discarded. The braided area of the percutaneous extender was cut below the connector block and discarded. Using the same incision site, a pocket was created bluntly about 1 inch in depth. This pouch was irrigated with Gentamycin solution combined with sterile water. Taking care to keep the lead contact points dry, the lead was inserted into internal neurostimulator (internal pulse generator (IPG)) until the point the blue end was confirmed to be seen extending all the way inside the internal pulse generator. The screws were  secured in place. The IPG was then inserted into the tissue pocket in proper orientation. Irrigation was again performed with the Gentamycin solution. The subcutaneous tissue was reapproximated with 2-0 Vicryl. The skin was closed with a 4-0 subcuticular stitch. A simple stitch was placed over the midline site at the lead insertion. Sponge and instrument counts were correct.   At The patient tolerated the procedure well, and was transferred to the recovery room in excellent condition. In the PACU the neurostimulator was programmed to the hand-held device.    Complications:  None; patient tolerated the procedure well.    Disposition: PACU - hemodynamically stable.  Condition: stable         Additional Details:     Attending Attestation: I was present and scrubbed for the entire procedure.    Reed Garcia  Phone Number: [phone]

## 2024-08-05 NOTE — H&P
History Of Present Illness  Regina Reyez is a 84 y.o. female presents for stage 2 Axonics interstim placement in the setting of OAB.     Past Medical History  Past Medical History:   Diagnosis Date    Aneurysm of thoracic aorta (CMS-HCC)     4.3cm on CT 1/23    Arthritis     COPD (chronic obstructive pulmonary disease) (Multi)     Encounter for immunization 10/08/2018    Need for tetanus booster    Encounter for screening for other viral diseases 10/08/2018    Need for hepatitis C screening test    Full dentures     upper only    Heart murmur     History of acute bronchitis 10/17/2019    History of appendicitis 10/24/2019    History of cataract 04/02/2015    History of dyspnea 08/19/2019    History of influenza vaccination 10/24/2019    Hyperlipidemia     Hypertension     Murmur, cardiac     Nausea 10/18/2019    Nausea in adult    Nontoxic multinodular goiter     OAB (overactive bladder)     Perinephric fluid collection 07/29/2019    Phlebitis and thrombophlebitis of other sites 10/24/2019    Phlebitis of arm    Pulmonary emphysema (Multi)     Radial styloid tenosynovitis (de quervain) 04/12/2016    De Quervain's tenosynovitis    Right upper quadrant pain 04/08/2016    Postprandial RUQ pain    Screening for malignant neoplasms, colon     Screening for malignant neoplasms, colon    Tubulo-interstitial nephritis, not specified as acute or chronic 07/29/2019    Pyelonephritis of right kidney    Urinary incontinence        Surgical History  Past Surgical History:   Procedure Laterality Date    APPENDECTOMY      BLADDER SURGERY  07/24/2024    STAGE 1 STIMULATOR PLACEMENT    BREAST LUMPECTOMY Right     CATARACT EXTRACTION      COLONOSCOPY  10/08/2013    Complete Colonoscopy    THUMB ARTHROSCOPY Right     TONSILLECTOMY  09/20/2019    TRIGGER FINGER RELEASE  09/20/2019    US GUIDED THYROID BIOPSY  08/06/2019    US GUIDED THYROID BIOPSY 8/6/2019 [facility]        Social History  She reports that she quit smoking about  "40 years ago. Her smoking use included cigarettes. She has never used smokeless tobacco. She reports that she does not currently use alcohol. She reports that she does not use drugs.    Family History  Family History   Problem Relation Name Age of Onset    Leukemia Mother  96    Alcohol abuse Father      COPD Father  72        Allergies  Atorvastatin    Review of Systems   All other systems reviewed and are negative.       Physical Exam  General: Well appearing, alert  HEENT: normocephalic, EOMI, clear sclera  Cardio: Warm and well perfused  Resp: breathing comfortably on room air  Abd: soft, nontender, nondistended  Neuro: grossly intact, no focal deficits  Extremities: full ROM, no calf tenderness  Psych: A&O x3, appropriate mood and affect       Last Recorded Vitals  Blood pressure 128/63, pulse 71, temperature 36 °C (96.8 °F), resp. rate 17, height 1.676 m (5' 6\"), weight 79 kg (174 lb 2.6 oz), SpO2 97%.    Relevant Results  Labs in chart reviewed       Assessment/Plan   Principal Problem:    Urge incontinence  Active Problems:    OAB (overactive bladder)    Regina Reyez is a 84 y.o. female presents for stage 2 Axonics interstim placement in the setting of OAB.  - proceed with surgery as planned    Discussed with Dr. Radha Childs MD  PGY-3, Obstetrics and Gynecology    "

## 2024-08-05 NOTE — ANESTHESIA PREPROCEDURE EVALUATION
Patient: Regina Reyez    Procedure Information       Date/Time: 08/05/24 1230    Procedure: STAGE 2 AXONICS - 67504    [facility]    Surgeons: Reed Garcia MD            Relevant Problems   Anesthesia (within normal limits)      Cardiac   (+) Essential hypertension   (+) Heart murmur   (+) Hyperlipidemia   (+) Thoracic aortic aneurysm without rupture (CMS-HCC)      Pulmonary   (+) COPD, moderate (Multi)   (+) Pulmonary emphysema (Multi)      Neuro   (+) Anxiety      GI (within normal limits)      /Renal (within normal limits)      Liver (within normal limits)      Endocrine   (+) Nontoxic multinodular goiter      Hematology (within normal limits)      Musculoskeletal (within normal limits)      HEENT   (+) Asymmetrical sensorineural hearing loss   (+) Sensorineural hearing loss, bilateral      ID   (+) COVID-19 virus infection      Skin   (+) Eczema of external auditory canal       Clinical information reviewed:    Allergies  Meds   Med Hx  Surg Hx  OB Status           NPO Detail:  NPO/Void Status  Carbohydrate Drink Given Prior to Surgery? : N  Date of Last Liquid: 08/05/24  Time of Last Liquid: 0900  Date of Last Solid: 08/04/24  Time of Last Solid: 2100  Last Intake Type: Clear fluids  Time of Last Void: 1100         Physical Exam    Airway  Mallampati: III  TM distance: >3 FB  Neck ROM: full     Cardiovascular   Rhythm: regular  Rate: normal     Dental   (+) upper dentures     Pulmonary - normal exam     Abdominal            Anesthesia Plan    History of general anesthesia?: yes  History of complications of general anesthesia?: no    ASA 3     MAC     The patient is not a current smoker.    Anesthetic plan and risks discussed with patient.  Use of blood products discussed with patient who consented to blood products.    Plan discussed with CRNA.

## 2024-08-28 ENCOUNTER — APPOINTMENT (OUTPATIENT)
Dept: PRIMARY CARE | Facility: CLINIC | Age: 84
End: 2024-08-28
Payer: MEDICARE

## 2024-08-28 VITALS
HEART RATE: 61 BPM | OXYGEN SATURATION: 99 % | WEIGHT: 177 LBS | SYSTOLIC BLOOD PRESSURE: 132 MMHG | DIASTOLIC BLOOD PRESSURE: 80 MMHG | BODY MASS INDEX: 28.57 KG/M2

## 2024-08-28 DIAGNOSIS — Z12.31 VISIT FOR SCREENING MAMMOGRAM: ICD-10-CM

## 2024-08-28 DIAGNOSIS — I71.21 ANEURYSM OF ASCENDING AORTA WITHOUT RUPTURE (CMS-HCC): ICD-10-CM

## 2024-08-28 DIAGNOSIS — I10 ESSENTIAL HYPERTENSION: ICD-10-CM

## 2024-08-28 DIAGNOSIS — Z00.00 ROUTINE GENERAL MEDICAL EXAMINATION AT HEALTH CARE FACILITY: Primary | ICD-10-CM

## 2024-08-28 DIAGNOSIS — R73.01 IFG (IMPAIRED FASTING GLUCOSE): ICD-10-CM

## 2024-08-28 DIAGNOSIS — J44.9 COPD, MODERATE (MULTI): ICD-10-CM

## 2024-08-28 DIAGNOSIS — E78.00 PURE HYPERCHOLESTEROLEMIA: ICD-10-CM

## 2024-08-28 DIAGNOSIS — Z12.11 ENCOUNTER FOR SCREENING FOR MALIGNANT NEOPLASM OF COLON: ICD-10-CM

## 2024-08-28 DIAGNOSIS — E04.1 THYROID NODULE: ICD-10-CM

## 2024-08-28 PROCEDURE — 3075F SYST BP GE 130 - 139MM HG: CPT | Performed by: INTERNAL MEDICINE

## 2024-08-28 PROCEDURE — 1159F MED LIST DOCD IN RCRD: CPT | Performed by: INTERNAL MEDICINE

## 2024-08-28 PROCEDURE — G0439 PPPS, SUBSEQ VISIT: HCPCS | Performed by: INTERNAL MEDICINE

## 2024-08-28 PROCEDURE — 1170F FXNL STATUS ASSESSED: CPT | Performed by: INTERNAL MEDICINE

## 2024-08-28 PROCEDURE — 1160F RVW MEDS BY RX/DR IN RCRD: CPT | Performed by: INTERNAL MEDICINE

## 2024-08-28 PROCEDURE — 99214 OFFICE O/P EST MOD 30 MIN: CPT | Performed by: INTERNAL MEDICINE

## 2024-08-28 PROCEDURE — 1036F TOBACCO NON-USER: CPT | Performed by: INTERNAL MEDICINE

## 2024-08-28 PROCEDURE — 3079F DIAST BP 80-89 MM HG: CPT | Performed by: INTERNAL MEDICINE

## 2024-08-28 RX ORDER — ALBUTEROL SULFATE 90 UG/1
2 INHALANT RESPIRATORY (INHALATION) EVERY 4 HOURS PRN
Qty: 8.5 G | Refills: 5 | Status: SHIPPED | OUTPATIENT
Start: 2024-08-28 | End: 2025-08-28

## 2024-08-28 ASSESSMENT — ACTIVITIES OF DAILY LIVING (ADL)
GROCERY_SHOPPING: INDEPENDENT
TAKING_MEDICATION: INDEPENDENT
BATHING: INDEPENDENT
DOING_HOUSEWORK: INDEPENDENT
MANAGING_FINANCES: INDEPENDENT
DRESSING: INDEPENDENT

## 2024-08-28 ASSESSMENT — PATIENT HEALTH QUESTIONNAIRE - PHQ9
SUM OF ALL RESPONSES TO PHQ9 QUESTIONS 1 AND 2: 0
1. LITTLE INTEREST OR PLEASURE IN DOING THINGS: NOT AT ALL
2. FEELING DOWN, DEPRESSED OR HOPELESS: NOT AT ALL

## 2024-08-28 NOTE — PROGRESS NOTES
Subjective   Reason for Visit: Regina Reyez is an 84 y.o. female here for a Medicare Wellness visit.     Past Medical, Surgical, and Family History reviewed and updated in chart.    Reviewed all medications by prescribing practitioner or clinical pharmacist (such as prescriptions, OTCs, herbal therapies and supplements) and documented in the medical record.    She was having lack of bladder control  She had a stimulator placed and doing well.     Do I have to worry about my aneursym/thoracic    She is doing the square breathing and that is helping her breathing and her breathing is stable.  She sometimes will get a gurgly chest cough   She sleeps flat  She is not sob going up steps.   She notes more that she is aware that she needs to do her breathing more often    What about her thyroid ultrasound    No bowel issues  Her urine frequency is much better since her bladder stimulator          Patient Care Team:  Magaly Merritt DO as PCP - General  Magaly Merritt DO as PCP - [place] [employer] PCP     Review of Systems    Objective   Vitals:  /80   Pulse 61   Wt 80.3 kg (177 lb)   SpO2 99%   BMI 28.57 kg/m²       Physical Exam  Constitutional:       Appearance: Normal appearance.   Neck:      Vascular: No carotid bruit.   Cardiovascular:      Rate and Rhythm: Normal rate and regular rhythm.      Pulses: Normal pulses.      Heart sounds: No murmur heard.  Pulmonary:      Breath sounds: Normal breath sounds.   Abdominal:      Palpations: Abdomen is soft. There is no mass.      Tenderness: There is no abdominal tenderness.      Comments: No HSM or pulsatile mass   Musculoskeletal:      Right lower leg: No edema.      Left lower leg: No edema.   Lymphadenopathy:      Cervical: No cervical adenopathy.   Skin:     Comments: Right upper buttock with bladder stim, palpated   Neurological:      Mental Status: She is alert and oriented to person, place, and time.   Psychiatric:         Mood and Affect:  Mood normal.         Assessment/Plan   Problem List Items Addressed This Visit    None  Visit Diagnoses         Codes    Routine general medical examination at health care facility    -  Primary Z00.00            Patient Instructions   Call [phone] to schedule mammogram, thyroid ultrasound and ct angio of the chest to check aneurysm    Bp controlled    Cholesterol excellent    Vaccines current, get seasonal covid and flu shots this fall    For colon screening, will do stool test.     Copd with rare flares (gurgling cough), use albuterol inhaler as needed    Increase exercise for lungs and sugar  Limit your carbs    Recheck in 1 year

## 2024-08-28 NOTE — PATIENT INSTRUCTIONS
Call [phone] to schedule mammogram, thyroid ultrasound and ct angio of the chest to check aneurysm    Bp controlled    Cholesterol excellent    Vaccines current, get seasonal covid and flu shots this fall    For colon screening, will do stool test.     Copd with rare flares (gurgling cough), use albuterol inhaler as needed    Increase exercise for lungs and sugar  Limit your carbs    Recheck in 1 year

## 2024-08-28 NOTE — PROGRESS NOTES
Subjective   Patient ID: Regina Reyez is a 84 y.o. female who presents for Follow-up (Go over few concerns ) and Medicare Annual Wellness Visit Subsequent.    HPI     Review of Systems    Objective   /80   Pulse 61   Wt 80.3 kg (177 lb)   SpO2 99%   BMI 28.57 kg/m²     Physical Exam    Assessment/Plan

## 2024-08-29 DIAGNOSIS — I71.21 ANEURYSM OF ASCENDING AORTA WITHOUT RUPTURE (CMS-HCC): Primary | ICD-10-CM

## 2024-09-05 ENCOUNTER — LAB (OUTPATIENT)
Dept: LAB | Facility: LAB | Age: 84
End: 2024-09-05
Payer: MEDICARE

## 2024-09-05 DIAGNOSIS — I71.21 ANEURYSM OF ASCENDING AORTA WITHOUT RUPTURE (CMS-HCC): ICD-10-CM

## 2024-09-05 LAB
ANION GAP SERPL CALC-SCNC: 14 MMOL/L (ref 10–20)
BUN SERPL-MCNC: 18 MG/DL (ref 6–23)
CALCIUM SERPL-MCNC: 8.8 MG/DL (ref 8.6–10.3)
CHLORIDE SERPL-SCNC: 97 MMOL/L (ref 98–107)
CO2 SERPL-SCNC: 33 MMOL/L (ref 21–32)
CREAT SERPL-MCNC: 0.72 MG/DL (ref 0.5–1.05)
EGFRCR SERPLBLD CKD-EPI 2021: 82 ML/MIN/1.73M*2
GLUCOSE SERPL-MCNC: 143 MG/DL (ref 74–99)
POTASSIUM SERPL-SCNC: 4.4 MMOL/L (ref 3.5–5.3)
SODIUM SERPL-SCNC: 140 MMOL/L (ref 136–145)

## 2024-09-05 PROCEDURE — 36415 COLL VENOUS BLD VENIPUNCTURE: CPT

## 2024-09-05 PROCEDURE — 80048 BASIC METABOLIC PNL TOTAL CA: CPT

## 2024-09-12 ENCOUNTER — HOSPITAL ENCOUNTER (OUTPATIENT)
Dept: RADIOLOGY | Facility: HOSPITAL | Age: 84
Discharge: HOME | End: 2024-09-12
Payer: MEDICARE

## 2024-09-12 DIAGNOSIS — I71.21 ANEURYSM OF ASCENDING AORTA WITHOUT RUPTURE (CMS-HCC): ICD-10-CM

## 2024-09-12 DIAGNOSIS — E04.1 THYROID NODULE: ICD-10-CM

## 2024-09-12 PROCEDURE — 71275 CT ANGIOGRAPHY CHEST: CPT | Performed by: STUDENT IN AN ORGANIZED HEALTH CARE EDUCATION/TRAINING PROGRAM

## 2024-09-12 PROCEDURE — 71275 CT ANGIOGRAPHY CHEST: CPT

## 2024-09-12 PROCEDURE — 2550000001 HC RX 255 CONTRASTS: Performed by: INTERNAL MEDICINE

## 2024-09-12 PROCEDURE — 76536 US EXAM OF HEAD AND NECK: CPT

## 2024-09-17 PROCEDURE — 82274 ASSAY TEST FOR BLOOD FECAL: CPT | Performed by: INTERNAL MEDICINE

## 2024-09-19 ENCOUNTER — APPOINTMENT (OUTPATIENT)
Dept: UROLOGY | Facility: CLINIC | Age: 84
End: 2024-09-19
Payer: MEDICARE

## 2024-09-19 DIAGNOSIS — R35.1 NOCTURIA: ICD-10-CM

## 2024-09-19 DIAGNOSIS — N32.81 OAB (OVERACTIVE BLADDER): Primary | ICD-10-CM

## 2024-09-19 LAB — HEMOCCULT STL QL IA: NEGATIVE

## 2024-09-19 PROCEDURE — 99213 OFFICE O/P EST LOW 20 MIN: CPT | Performed by: STUDENT IN AN ORGANIZED HEALTH CARE EDUCATION/TRAINING PROGRAM

## 2024-09-19 NOTE — PROGRESS NOTES
HISTORY OF PRESENT ILLNESS:  Regina Reyez is a 84 y.o. female who presents today 6 weeks status post stage 2 axonics, 8/5/24. She is doing well. She does not have any questions or concerns at this time.           Past Medical History  She has a past medical history of Aneurysm of thoracic aorta (CMS-HCC), Arthritis, COPD (chronic obstructive pulmonary disease) (Multi), Encounter for immunization (10/08/2018), Encounter for screening for other viral diseases (10/08/2018), Full dentures, Heart murmur, History of acute bronchitis (10/17/2019), History of appendicitis (10/24/2019), History of cataract (04/02/2015), History of dyspnea (08/19/2019), History of influenza vaccination (10/24/2019), Hyperlipidemia, Hypertension, Murmur, cardiac, Nausea (10/18/2019), Nontoxic multinodular goiter, OAB (overactive bladder), Perinephric fluid collection (07/29/2019), Phlebitis and thrombophlebitis of other sites (10/24/2019), Pulmonary emphysema (Multi), Radial styloid tenosynovitis (de quervain) (04/12/2016), Right upper quadrant pain (04/08/2016), Screening for malignant neoplasms, colon, Tubulo-interstitial nephritis, 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    Surgical History  She has a past surgical history that includes Tonsillectomy (09/20/2019); Breast lumpectomy (Right); Trigger finger release (09/20/2019); Colonoscopy (10/08/2013); US guided thyroid biopsy (08/06/2019); Appendectomy; Cataract extraction; Thumb arthroscopy (Right); and Bladder surgery (07/24/2024).     Social History  She reports that she quit smoking about 40 years ago. Her smoking use included cigarettes. She has never used smokeless tobacco. She reports that she does not currently use alcohol. She reports that she does not use drugs.    Family History  Family History   Problem Relation Name Age of Onset    Leukemia Mother  96    Alcohol abuse Father      COPD Father  72        Allergies  Atorvastatin      A  "comprehensive 10+ review of systems was negative except for: see hpi                          PHYSICAL EXAMINATION:  BP Readings from Last 3 Encounters:   08/28/24 132/80   08/05/24 130/69   07/24/24 136/57      Wt Readings from Last 3 Encounters:   08/28/24 80.3 kg (177 lb)   08/05/24 79 kg (174 lb 2.6 oz)   07/24/24 79.7 kg (175 lb 11.3 oz)      BMI: Estimated body mass index is 28.57 kg/m² as calculated from the following:    Height as of 8/5/24: 1.676 m (5' 6\").    Weight as of 8/28/24: 80.3 kg (177 lb).  BSA: Estimated body surface area is 1.93 meters squared as calculated from the following:    Height as of 8/5/24: 1.676 m (5' 6\").    Weight as of 8/28/24: 80.3 kg (177 lb).  HEENT: Normocephalic, atraumatic, PER EOMI, nonicteric, trachea normal, thyroid normal, oropharynx normal.  CARDIAC: regular rate & rhythm, S1 & S2 normal.  No heaves, thrills, gallops or murmurs.  LUNGS: Clear to auscultation, no spinal or CV tenderness.  EXTREMITIES: No evidence of cyanosis, clubbing or edema.               Assessment:  83-year-old with urinary urge incontinence, overactive bladder and nocturia     OAB/Nocturia:  On Gemtesa, continue taking for now, this is somewhat helpful but wants more definitive management  UDS: normal emptying no stress incontinence  S/P stage 1 stimulator placement on 7/24/24, doing well, >90%  improvement  S/P stage 2 stimulator placement on 8/5/24, doing well, no restrictions       Follow up in  6 months with Breanna       All questions and concerns were answered and addressed.  The patient expressed understanding and agrees with the plan.     Reed Garcia MD    Scribe Attestation  By signing my name below, I Magdalenafabricio Guerin, [place]   attest that this documentation has been prepared under the direction and in the presence of Reed Garcia MD.  "

## 2024-10-05 DIAGNOSIS — I10 ESSENTIAL HYPERTENSION: ICD-10-CM

## 2024-10-05 DIAGNOSIS — E78.5 DYSLIPIDEMIA, GOAL LDL BELOW 130: ICD-10-CM

## 2024-10-08 RX ORDER — PRAVASTATIN SODIUM 20 MG/1
20 TABLET ORAL DAILY
Qty: 90 TABLET | Refills: 0 | Status: SHIPPED | OUTPATIENT
Start: 2024-10-08

## 2024-10-08 RX ORDER — HYDROCHLOROTHIAZIDE 25 MG/1
25 TABLET ORAL DAILY
Qty: 90 TABLET | Refills: 0 | Status: SHIPPED | OUTPATIENT
Start: 2024-10-08

## 2024-10-25 ENCOUNTER — APPOINTMENT (OUTPATIENT)
Dept: RADIOLOGY | Facility: HOSPITAL | Age: 84
End: 2024-10-25
Payer: MEDICARE

## 2024-10-29 ENCOUNTER — HOSPITAL ENCOUNTER (OUTPATIENT)
Dept: RADIOLOGY | Facility: HOSPITAL | Age: 84
Discharge: HOME | End: 2024-10-29
Payer: MEDICARE

## 2024-10-29 VITALS — BODY MASS INDEX: 29.73 KG/M2 | WEIGHT: 185 LBS | HEIGHT: 66 IN

## 2024-10-29 DIAGNOSIS — Z12.31 VISIT FOR SCREENING MAMMOGRAM: ICD-10-CM

## 2024-10-29 PROCEDURE — 77063 BREAST TOMOSYNTHESIS BI: CPT | Performed by: RADIOLOGY

## 2024-10-29 PROCEDURE — 77067 SCR MAMMO BI INCL CAD: CPT

## 2024-10-29 PROCEDURE — 77067 SCR MAMMO BI INCL CAD: CPT | Performed by: RADIOLOGY

## 2024-10-31 DIAGNOSIS — R92.8 ABNORMAL MAMMOGRAM: Primary | ICD-10-CM

## 2024-11-11 ENCOUNTER — HOSPITAL ENCOUNTER (OUTPATIENT)
Dept: RADIOLOGY | Facility: HOSPITAL | Age: 84
Discharge: HOME | End: 2024-11-11
Payer: MEDICARE

## 2024-11-11 DIAGNOSIS — R92.8 ABNORMAL MAMMOGRAM: ICD-10-CM

## 2024-11-11 PROCEDURE — 77065 DX MAMMO INCL CAD UNI: CPT | Mod: RT

## 2024-11-11 PROCEDURE — 77065 DX MAMMO INCL CAD UNI: CPT | Mod: RIGHT SIDE | Performed by: RADIOLOGY

## 2024-11-14 DIAGNOSIS — R92.8 ABNORMAL MAMMOGRAM: Primary | ICD-10-CM

## 2024-12-09 PROBLEM — H61.20 CERUMEN IMPACTION: Status: RESOLVED | Noted: 2023-09-26 | Resolved: 2024-12-09

## 2024-12-09 PROBLEM — J40 BRONCHITIS: Status: RESOLVED | Noted: 2024-03-28 | Resolved: 2024-12-09

## 2024-12-09 PROBLEM — R05.9 COUGH: Status: RESOLVED | Noted: 2018-10-13 | Resolved: 2024-12-09

## 2024-12-09 PROBLEM — H61.23 BILATERAL IMPACTED CERUMEN: Status: RESOLVED | Noted: 2020-10-20 | Resolved: 2024-12-09

## 2024-12-09 PROBLEM — U07.1 COVID-19 VIRUS INFECTION: Status: RESOLVED | Noted: 2023-09-26 | Resolved: 2024-12-09

## 2024-12-09 PROBLEM — K37 APPENDICITIS: Status: RESOLVED | Noted: 2024-03-28 | Resolved: 2024-12-09

## 2024-12-10 ENCOUNTER — APPOINTMENT (OUTPATIENT)
Dept: PRIMARY CARE | Facility: CLINIC | Age: 84
End: 2024-12-10
Payer: MEDICARE

## 2024-12-10 VITALS
DIASTOLIC BLOOD PRESSURE: 76 MMHG | WEIGHT: 179 LBS | BODY MASS INDEX: 28.77 KG/M2 | HEIGHT: 66 IN | SYSTOLIC BLOOD PRESSURE: 128 MMHG | HEART RATE: 73 BPM | OXYGEN SATURATION: 97 %

## 2024-12-10 DIAGNOSIS — I10 ESSENTIAL HYPERTENSION: ICD-10-CM

## 2024-12-10 DIAGNOSIS — J44.9 COPD, MODERATE (MULTI): ICD-10-CM

## 2024-12-10 DIAGNOSIS — N32.81 OAB (OVERACTIVE BLADDER): ICD-10-CM

## 2024-12-10 DIAGNOSIS — Z00.00 ROUTINE ADULT HEALTH MAINTENANCE: Primary | ICD-10-CM

## 2024-12-10 DIAGNOSIS — L40.9 PSORIASIS: ICD-10-CM

## 2024-12-10 DIAGNOSIS — I71.21 ANEURYSM OF ASCENDING AORTA WITHOUT RUPTURE (CMS-HCC): ICD-10-CM

## 2024-12-10 DIAGNOSIS — H61.23 EXCESSIVE CERUMEN IN BOTH EAR CANALS: ICD-10-CM

## 2024-12-10 DIAGNOSIS — E78.00 PURE HYPERCHOLESTEROLEMIA: ICD-10-CM

## 2024-12-10 DIAGNOSIS — R01.1 HEART MURMUR: ICD-10-CM

## 2024-12-10 DIAGNOSIS — F41.9 ANXIETY: ICD-10-CM

## 2024-12-10 DIAGNOSIS — R92.8 ABNORMAL MAMMOGRAM: ICD-10-CM

## 2024-12-10 PROBLEM — R06.02 SHORTNESS OF BREATH: Status: RESOLVED | Noted: 2024-03-28 | Resolved: 2024-12-10

## 2024-12-10 PROBLEM — N28.9 DISORDER OF KIDNEY: Status: RESOLVED | Noted: 2024-03-28 | Resolved: 2024-12-10

## 2024-12-10 PROBLEM — J43.9 PULMONARY EMPHYSEMA (MULTI): Status: RESOLVED | Noted: 2023-09-26 | Resolved: 2024-12-10

## 2024-12-10 PROBLEM — R73.01 IFG (IMPAIRED FASTING GLUCOSE): Status: RESOLVED | Noted: 2023-09-26 | Resolved: 2024-12-10

## 2024-12-10 PROBLEM — R06.2 WHEEZING: Status: RESOLVED | Noted: 2023-09-26 | Resolved: 2024-12-10

## 2024-12-10 PROBLEM — J30.0 VASOMOTOR RHINITIS: Status: RESOLVED | Noted: 2023-09-26 | Resolved: 2024-12-10

## 2024-12-10 PROBLEM — N39.41 URGE INCONTINENCE: Status: RESOLVED | Noted: 2024-07-18 | Resolved: 2024-12-10

## 2024-12-10 PROBLEM — H60.549 ECZEMA OF EXTERNAL AUDITORY CANAL: Status: RESOLVED | Noted: 2020-10-20 | Resolved: 2024-12-10

## 2024-12-10 PROCEDURE — 3078F DIAST BP <80 MM HG: CPT | Performed by: NURSE PRACTITIONER

## 2024-12-10 PROCEDURE — 1159F MED LIST DOCD IN RCRD: CPT | Performed by: NURSE PRACTITIONER

## 2024-12-10 PROCEDURE — 3074F SYST BP LT 130 MM HG: CPT | Performed by: NURSE PRACTITIONER

## 2024-12-10 PROCEDURE — 1160F RVW MEDS BY RX/DR IN RCRD: CPT | Performed by: NURSE PRACTITIONER

## 2024-12-10 PROCEDURE — 1123F ACP DISCUSS/DSCN MKR DOCD: CPT | Performed by: NURSE PRACTITIONER

## 2024-12-10 PROCEDURE — 69209 REMOVE IMPACTED EAR WAX UNI: CPT | Performed by: NURSE PRACTITIONER

## 2024-12-10 PROCEDURE — 99214 OFFICE O/P EST MOD 30 MIN: CPT | Performed by: NURSE PRACTITIONER

## 2024-12-10 PROCEDURE — 1158F ADVNC CARE PLAN TLK DOCD: CPT | Performed by: NURSE PRACTITIONER

## 2024-12-10 PROCEDURE — G2211 COMPLEX E/M VISIT ADD ON: HCPCS | Performed by: NURSE PRACTITIONER

## 2024-12-10 PROCEDURE — 1036F TOBACCO NON-USER: CPT | Performed by: NURSE PRACTITIONER

## 2024-12-10 ASSESSMENT — ENCOUNTER SYMPTOMS
DIZZINESS: 0
WEAKNESS: 0
TROUBLE SWALLOWING: 0
JOINT SWELLING: 0
CONSTIPATION: 1
BRUISES/BLEEDS EASILY: 0
MYALGIAS: 0
DIFFICULTY URINATING: 0
PALPITATIONS: 0
COUGH: 0
FATIGUE: 0
WOUND: 0
SHORTNESS OF BREATH: 0
EYE PAIN: 0
WHEEZING: 0
ABDOMINAL PAIN: 0
HEADACHES: 0
ADENOPATHY: 0
COLOR CHANGE: 0
DIARRHEA: 0
ABDOMINAL DISTENTION: 0
BACK PAIN: 0
FEVER: 0
CHILLS: 0
NAUSEA: 0
SEIZURES: 0

## 2024-12-10 NOTE — ASSESSMENT & PLAN NOTE
"Most recent echo completed in 2022 and showed: \" 1. The left ventricular systolic function is normal with a 55-60% estimated ejection fraction.  2. The left ventricular cavity size is normal. Spectral Doppler shows an impaired relaxation pattern of left ventricular diastolic filling. There are normal left ventricular filling pressures.  3. There is trace to mild aortic valve regurgitation.   4. Aortic valve stenosis is not present\"  -Patient is currently asymptomatic.  Will continue to monitor as needed  "

## 2024-12-10 NOTE — ASSESSMENT & PLAN NOTE
Stable on current medication regiment.  Will continue to monitor vital signs at subsequent visits.  Provider to be notified for any new cardiac concerns.

## 2024-12-10 NOTE — ASSESSMENT & PLAN NOTE
Patient continues on daily statin without issue.  Will continue to monitor fasting cholesterol panel annually

## 2024-12-10 NOTE — PROGRESS NOTES
Subjective   Patient ID: Regina Reyez is a 84 y.o. female who presents for Follow-up (Meds and test results).    Patient seen today in order to establish primary care.  Patient is a retired  who is .  Her son currently lives with her and she remains very independent.  Patient states that she stays busy in her [employer] and community, going out frequently with friends and family.  Although patient continues to report episodes of grief related to her , she feels like her anxiety is well-controlled with current medication.  No significant issues with depression or insomnia.  Overall good support system reported.  Patient ambulates independently and denies any recent falls.  She does not currently exercise but denies any issues with shortness of breath or chest pain upon exertion.  No reported issues with appetite or staying hydrated.  Occasional constipation is relieved with prunes.  Patient recently undergone a bladder neurostimulator to help with her overactive bladder symptoms.  She states that it was very effective at first but she needs to touch base with her team regarding settings.  Patient has a history of hypertension which appears to be well-controlled.  No reported issues with chest pain/pressure, headaches, blurred vision or other cardiac concerns.  She follows routinely with dermatology for history of skin cancer and psoriasis.  Patient states that psoriasis is well-controlled with routine Skyrizi treatments.  Discussed diagnosis of COPD.  Patient denies any issues with shortness of breath, cough or wheezing.  She states that she does not utilize rescue inhaler as it is not needed.  Medications reviewed.  No other acute concerns voiced at this time.         Current Outpatient Medications on File Prior to Visit   Medication Sig Dispense Refill    albuterol (ProAir HFA) 90 mcg/actuation inhaler Inhale 2 puffs every 4 hours if needed for wheezing or shortness of breath.  8.5 g 5    amitriptyline (Elavil) 50 mg tablet TAKE 1 TABLET DAILY AT     BEDTIME (Patient taking differently: Take 1 tablet (50 mg) by mouth once daily at bedtime.) 90 tablet 3    aspirin 81 mg EC tablet Take 1 tablet (81 mg) by mouth every other day.      hydroCHLOROthiazide (HYDRODiuril) 25 mg tablet TAKE 1 TABLET ONCE DAILY 90 tablet 0    metoprolol succinate XL (Toprol-XL) 25 mg 24 hr tablet TAKE 1 TABLET DAILY 90 tablet 1    pravastatin (Pravachol) 20 mg tablet TAKE 1 TABLET DAILY 90 tablet 0    Skyrizi 150 mg/mL syringe syringe Inject 1 mL (150 mg) under the skin. Every 12 weeks       No current facility-administered medications on file prior to visit.       Past Medical History:   Diagnosis Date    Aneurysm of thoracic aorta (CMS-HCC)     4.3cm on CT 1/23    Arthritis     COPD (chronic obstructive pulmonary disease) (Multi)     Encounter for immunization 10/08/2018    Need for tetanus booster    Encounter for screening for other viral diseases 10/08/2018    Need for hepatitis C screening test    Full dentures     upper only    Heart murmur     History of acute bronchitis 10/17/2019    History of appendicitis 10/24/2019    History of cataract 04/02/2015    History of dyspnea 08/19/2019    History of influenza vaccination 10/24/2019    Hyperlipidemia     Hypertension     Murmur, cardiac     Nausea 10/18/2019    Nausea in adult    Nontoxic multinodular goiter     OAB (overactive bladder)     Perinephric fluid collection 07/29/2019    Phlebitis and thrombophlebitis of other sites 10/24/2019    Phlebitis of arm    Pulmonary emphysema (Multi)     Radial styloid tenosynovitis (de quervain) 04/12/2016    De Quervain's tenosynovitis    Right upper quadrant pain 04/08/2016    Postprandial RUQ pain    Screening for malignant neoplasms, colon     Screening for malignant neoplasms, colon    Tubulo-interstitial nephritis, not specified as acute or chronic 07/29/2019    Pyelonephritis of right kidney    Urinary  incontinence         Past Surgical History:   Procedure Laterality Date    APPENDECTOMY      BLADDER SURGERY  07/24/2024    STAGE 1 STIMULATOR PLACEMENT    BREAST BIOPSY Bilateral     benign bilat bx. pt does not remember when    CATARACT EXTRACTION      COLONOSCOPY  10/08/2013    Complete Colonoscopy    THUMB ARTHROSCOPY Right     TONSILLECTOMY  09/20/2019    TRIGGER FINGER RELEASE  09/20/2019    US GUIDED THYROID BIOPSY  08/06/2019    US GUIDED THYROID BIOPSY 8/6/2019 [facility]        Family History   Problem Relation Name Age of Onset    Leukemia Mother  96    Alcohol abuse Father      COPD Father  72        Review of Systems   Constitutional:  Negative for chills, fatigue and fever.   HENT:  Negative for dental problem and trouble swallowing.    Eyes:  Negative for pain and visual disturbance.        Wears glasses   Respiratory:  Negative for cough, shortness of breath and wheezing.    Cardiovascular:  Negative for chest pain, palpitations and leg swelling.   Gastrointestinal:  Positive for constipation. Negative for abdominal distention, abdominal pain, diarrhea and nausea.        Positive for occasion constipation   Endocrine: Negative for cold intolerance and heat intolerance.   Genitourinary:  Negative for difficulty urinating.        Positive for recent bladder nerve stimulator placement   Musculoskeletal:  Negative for back pain, gait problem, joint swelling and myalgias.   Skin:  Negative for color change, pallor, rash and wound.        Positive for history for skin cancer and psoriasis   Allergic/Immunologic: Negative for environmental allergies and food allergies.   Neurological:  Negative for dizziness, seizures, weakness and headaches.   Hematological:  Negative for adenopathy. Does not bruise/bleed easily.   Psychiatric/Behavioral:  Negative for behavioral problems.         Positive for occasional grief and anxiety (well controlled)   All other systems reviewed and are negative.      Objective  "  /76   Pulse 73   Ht 1.676 m (5' 6\")   Wt 81.2 kg (179 lb)   SpO2 97%   BMI 28.89 kg/m²     Physical Exam  Constitutional:       General: She is not in acute distress.     Appearance: Normal appearance. She is not toxic-appearing.   HENT:      Head: Normocephalic and atraumatic.      Right Ear: Tympanic membrane, ear canal and external ear normal.      Left Ear: Tympanic membrane, ear canal and external ear normal.      Ears:      Comments: Moderate cerumen in bilateral ear canals     Nose: Nose normal.      Mouth/Throat:      Mouth: Mucous membranes are moist.      Pharynx: Oropharynx is clear.   Eyes:      Extraocular Movements: Extraocular movements intact.      Conjunctiva/sclera: Conjunctivae normal.      Pupils: Pupils are equal, round, and reactive to light.   Cardiovascular:      Rate and Rhythm: Normal rate and regular rhythm.      Pulses: Normal pulses.      Heart sounds: Murmur heard.   Pulmonary:      Effort: Pulmonary effort is normal.      Breath sounds: Normal breath sounds. No wheezing.   Abdominal:      General: Bowel sounds are normal.      Palpations: Abdomen is soft.   Musculoskeletal:         General: No swelling.      Cervical back: Normal range of motion and neck supple.   Skin:     General: Skin is warm and dry.   Neurological:      General: No focal deficit present.      Mental Status: She is alert and oriented to person, place, and time. Mental status is at baseline.      Cranial Nerves: No cranial nerve deficit.      Motor: No weakness.   Psychiatric:         Mood and Affect: Mood normal.         Behavior: Behavior normal.         Thought Content: Thought content normal.         Judgment: Judgment normal.         Assessment/Plan   Problem List Items Addressed This Visit             ICD-10-CM    COPD, moderate (Multi) J44.9     Asymptomatic.  Patient reports that she does not use rescue inhaler         Hyperlipidemia E78.5     Patient continues on daily statin without issue.  " "Will continue to monitor fasting cholesterol panel annually         Heart murmur R01.1     Most recent echo completed in 2022 and showed: \" 1. The left ventricular systolic function is normal with a 55-60% estimated ejection fraction.  2. The left ventricular cavity size is normal. Spectral Doppler shows an impaired relaxation pattern of left ventricular diastolic filling. There are normal left ventricular filling pressures.  3. There is trace to mild aortic valve regurgitation.   4. Aortic valve stenosis is not present\"  -Patient is currently asymptomatic.  Will continue to monitor as needed         Essential hypertension I10     Stable on current medication regiment.  Will continue to monitor vital signs at subsequent visits.  Provider to be notified for any new cardiac concerns.         Thoracic aortic aneurysm without rupture (CMS-[facility]) I71.20     Most recent CT angio of the chest completed earlier this year and showed \"Stable dilation of the ascending thoracic aorta measuring up to 4.3 cm at its maximal diameter. This appears relatively unchanged compared to previous CT angio of the chest from 01/25/2023. Consider follow-up surveillance CT angio of the chest and or MRA of the chest within 24-36 months\".  Discussed results with patient who is agreeable for follow-up imaging in 2 years.         Psoriasis L40.9     Stable with routine treatments per dermatology recommendations.  This currently includes Skyrizi         OAB (overactive bladder) N32.81     Patient recently underwent surgical placement of a bladder nerve stimulator for OAB.  She is to continue to follow with urology for continued evaluation and treatment recommendations.         Anxiety F41.9     Reportedly stable on current medication regiment.  Patient to notify provider for any persistent/worsening mood concerns.         Routine adult health maintenance - Primary Z00.00     Most recent blood work reviewed with patient.  Will continue to monitor as " "appropriate.  -No recent colonoscopy on file?  Previous primary care provider completed fecal occult blood testing earlier this year which was negative.  Patient is currently asymptomatic for bowel concerns  -Orders placed for diagnostic mammogram to be completed in May, 2025  -Most recent bone density completed in 2020 and showed osteopenia.  Will discuss repeat DEXA screening at subsequent visits.  -\"Stable dilation of the ascending thoracic aorta measuring up to 4.3 cm at its maximal diameter. This appears relatively unchanged compared to previous CT angio of the chest from 01/25/2023. Consider follow-up surveillance CT angio of the chest and or MRA of the chest within 24-36 months\".          Other Visit Diagnoses         Codes    Abnormal mammogram     R92.8    Relevant Orders    BI mammo right diagnostic               "

## 2024-12-10 NOTE — PATIENT INSTRUCTIONS
Mammogram completed in May, 2025. For any future breast imaging appointments, please call [phone]).     Please arrange for routine follow up in 6 months. You may schedule on-line through [employer] or call our office at [phone].

## 2024-12-10 NOTE — PROGRESS NOTES
Patient ID: Regina Reyez is a 84 y.o. female.    Ear Cerumen Removal    Date/Time: 12/10/2024 10:02 AM    Performed by: Aidee Strauss MA  Authorized by: KAREN Aguilar    Consent:     Consent obtained:  Verbal    Consent given by:  Patient    Risks, benefits, and alternatives were discussed: yes      Risks discussed:  Dizziness, infection, incomplete removal, pain, TM perforation and bleeding    Alternatives discussed:  Alternative treatment  Universal protocol:     Procedure explained and questions answered to patient or proxy's satisfaction: yes      Patient identity confirmed:  Verbally with patient  Procedure details:     Location:  R ear and L ear    Procedure type: irrigation      Procedure outcomes: cerumen removed    Post-procedure details:     Inspection:  No bleeding and ear canal clear    Hearing quality:  Improved    Procedure completion:  Tolerated

## 2024-12-10 NOTE — ASSESSMENT & PLAN NOTE
Patient recently underwent surgical placement of a bladder nerve stimulator for OAB.  She is to continue to follow with urology for continued evaluation and treatment recommendations.

## 2024-12-10 NOTE — ASSESSMENT & PLAN NOTE
Reportedly stable on current medication regiment.  Patient to notify provider for any persistent/worsening mood concerns.

## 2024-12-10 NOTE — ASSESSMENT & PLAN NOTE
"Most recent CT angio of the chest completed earlier this year and showed \"Stable dilation of the ascending thoracic aorta measuring up to 4.3 cm at its maximal diameter. This appears relatively unchanged compared to previous CT angio of the chest from 01/25/2023. Consider follow-up surveillance CT angio of the chest and or MRA of the chest within 24-36 months\".  Discussed results with patient who is agreeable for follow-up imaging in 2 years.  "

## 2024-12-10 NOTE — ASSESSMENT & PLAN NOTE
"Most recent blood work reviewed with patient.  Will continue to monitor as appropriate.  -No recent colonoscopy on file?  Previous primary care provider completed fecal occult blood testing earlier this year which was negative.  Patient is currently asymptomatic for bowel concerns  -Orders placed for diagnostic mammogram to be completed in May, 2025  -Most recent bone density completed in 2020 and showed osteopenia.  Will discuss repeat DEXA screening at subsequent visits.  -\"Stable dilation of the ascending thoracic aorta measuring up to 4.3 cm at its maximal diameter. This appears relatively unchanged compared to previous CT angio of the chest from 01/25/2023. Consider follow-up surveillance CT angio of the chest and or MRA of the chest within 24-36 months\".  "

## 2024-12-11 ENCOUNTER — TELEPHONE (OUTPATIENT)
Dept: PRIMARY CARE | Facility: CLINIC | Age: 84
End: 2024-12-11

## 2024-12-11 NOTE — TELEPHONE ENCOUNTER
Pt stated she forgot to mention at her first ov that she was part of a study for the brain with [facility].  She does blood work with them 1x yearly.

## 2025-01-03 DIAGNOSIS — I10 ESSENTIAL HYPERTENSION: ICD-10-CM

## 2025-01-03 DIAGNOSIS — E78.5 DYSLIPIDEMIA, GOAL LDL BELOW 130: ICD-10-CM

## 2025-01-03 DIAGNOSIS — I10 BENIGN ESSENTIAL HYPERTENSION: ICD-10-CM

## 2025-01-05 RX ORDER — HYDROCHLOROTHIAZIDE 25 MG/1
25 TABLET ORAL DAILY
Qty: 90 TABLET | Refills: 1 | Status: SHIPPED | OUTPATIENT
Start: 2025-01-05

## 2025-01-05 RX ORDER — PRAVASTATIN SODIUM 20 MG/1
20 TABLET ORAL DAILY
Qty: 90 TABLET | Refills: 1 | Status: SHIPPED | OUTPATIENT
Start: 2025-01-05

## 2025-01-05 RX ORDER — METOPROLOL SUCCINATE 25 MG/1
25 TABLET, EXTENDED RELEASE ORAL DAILY
Qty: 90 TABLET | Refills: 1 | Status: SHIPPED | OUTPATIENT
Start: 2025-01-05

## 2025-03-05 NOTE — PROGRESS NOTES
HISTORY OF PRESENT ILLNESS:  Regina Reyez is a 84 y.o. female who presents today for a follow up visit. She did have improvement at her last visit, but now she has no improvement. She is back to baseline.  She has not had any falls that she is aware of.          Past Medical History  She has a past medical history of Aneurysm of thoracic aorta (CMS-HCC), Arthritis, COPD (chronic obstructive pulmonary disease) (Multi), Encounter for immunization (10/08/2018), Encounter for screening for other viral diseases (10/08/2018), Full dentures, Heart murmur, History of acute bronchitis (10/17/2019), History of appendicitis (10/24/2019), History of cataract (04/02/2015), History of dyspnea (08/19/2019), History of influenza vaccination (10/24/2019), Hyperlipidemia, Hypertension, Murmur, cardiac, Nausea (10/18/2019), Nontoxic multinodular goiter, OAB (overactive bladder), Perinephric fluid collection (07/29/2019), Phlebitis and thrombophlebitis of other sites (10/24/2019), Pulmonary emphysema (Multi), Radial styloid tenosynovitis (de quervain) (04/12/2016), Right upper quadrant pain (04/08/2016), Screening for malignant neoplasms, colon, Tubulo-interstitial nephritis, 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    Surgical History  She has a past surgical history that includes Tonsillectomy (09/20/2019); Trigger finger release (09/20/2019); Colonoscopy (10/08/2013); US guided thyroid biopsy (08/06/2019); Appendectomy; Cataract extraction; Thumb arthroscopy (Right); Bladder surgery (07/24/2024); and Breast biopsy (Bilateral).     Social History  She reports that she quit smoking about 41 years ago. Her smoking use included cigarettes. She has never used smokeless tobacco. She reports that she does not currently use alcohol. She reports that she does not use drugs.    Family History  Family History   Problem Relation Name Age of Onset    Leukemia Mother  96    Alcohol abuse Father      COPD Father  " 72        Allergies  Atorvastatin      A comprehensive 10+ review of systems was negative except for: see hpi                          PHYSICAL EXAMINATION:  BP Readings from Last 3 Encounters:   12/10/24 128/76   08/28/24 132/80   08/05/24 130/69      Wt Readings from Last 3 Encounters:   12/10/24 81.2 kg (179 lb)   10/29/24 83.9 kg (185 lb)   08/28/24 80.3 kg (177 lb)      BMI: Estimated body mass index is 28.89 kg/m² as calculated from the following:    Height as of 12/10/24: 1.676 m (5' 6\").    Weight as of 12/10/24: 81.2 kg (179 lb).  BSA: Estimated body surface area is 1.94 meters squared as calculated from the following:    Height as of 12/10/24: 1.676 m (5' 6\").    Weight as of 12/10/24: 81.2 kg (179 lb).  HEENT: Normocephalic, atraumatic, PER EOMI, nonicteric, trachea normal, thyroid normal, oropharynx normal.  CARDIAC: regular rate & rhythm, S1 & S2 normal.  No heaves, thrills, gallops or murmurs.  LUNGS: Clear to auscultation, no spinal or CV tenderness.  EXTREMITIES: No evidence of cyanosis, clubbing or edema.  Here having some okay that he he is not good but today               Assessment:  84-year-old with urinary urge incontinence, overactive bladder and nocturia     OAB/Nocturia:  Failed gemtesa  UDS: normal emptying no stress incontinence  SNM  8/5/24, >90% improvement, sx seemed to have returned  Back to baseline, programing changed 3/6/25 to program 1  Urine culture ordered     Follow up in 4 weeks , interrogate device with [employer] team    Consider adding a med or botox       All questions and concerns were answered and addressed.  The patient expressed understanding and agrees with the plan.     Reed Garcia MD    Scribe Attestation  By signing my name below, I, Magdalena Guerin, Ary   attest that this documentation has been prepared under the direction and in the presence of Reed Garcia MD.  "

## 2025-03-06 ENCOUNTER — APPOINTMENT (OUTPATIENT)
Dept: UROLOGY | Facility: CLINIC | Age: 85
End: 2025-03-06
Payer: MEDICARE

## 2025-03-06 DIAGNOSIS — R30.0 DYSURIA: ICD-10-CM

## 2025-03-06 DIAGNOSIS — N32.81 OAB (OVERACTIVE BLADDER): Primary | ICD-10-CM

## 2025-03-06 PROCEDURE — 99214 OFFICE O/P EST MOD 30 MIN: CPT | Performed by: STUDENT IN AN ORGANIZED HEALTH CARE EDUCATION/TRAINING PROGRAM

## 2025-03-06 PROCEDURE — G2211 COMPLEX E/M VISIT ADD ON: HCPCS | Performed by: STUDENT IN AN ORGANIZED HEALTH CARE EDUCATION/TRAINING PROGRAM

## 2025-03-07 DIAGNOSIS — R30.0 DYSURIA: ICD-10-CM

## 2025-03-10 DIAGNOSIS — R30.0 DYSURIA: ICD-10-CM

## 2025-03-11 DIAGNOSIS — R30.0 DYSURIA: ICD-10-CM

## 2025-03-12 DIAGNOSIS — R30.0 DYSURIA: ICD-10-CM

## 2025-03-13 DIAGNOSIS — R30.0 DYSURIA: ICD-10-CM

## 2025-03-14 DIAGNOSIS — R30.0 DYSURIA: ICD-10-CM

## 2025-03-17 DIAGNOSIS — R30.0 DYSURIA: ICD-10-CM

## 2025-03-18 ENCOUNTER — OFFICE VISIT (OUTPATIENT)
Dept: PRIMARY CARE | Facility: CLINIC | Age: 85
End: 2025-03-18
Payer: MEDICARE

## 2025-03-18 VITALS
SYSTOLIC BLOOD PRESSURE: 132 MMHG | HEART RATE: 74 BPM | OXYGEN SATURATION: 91 % | TEMPERATURE: 98.3 F | DIASTOLIC BLOOD PRESSURE: 78 MMHG

## 2025-03-18 DIAGNOSIS — J40 BRONCHITIS: Primary | ICD-10-CM

## 2025-03-18 DIAGNOSIS — R30.0 DYSURIA: ICD-10-CM

## 2025-03-18 PROCEDURE — 99214 OFFICE O/P EST MOD 30 MIN: CPT | Performed by: FAMILY MEDICINE

## 2025-03-18 PROCEDURE — 1160F RVW MEDS BY RX/DR IN RCRD: CPT | Performed by: FAMILY MEDICINE

## 2025-03-18 PROCEDURE — 1159F MED LIST DOCD IN RCRD: CPT | Performed by: FAMILY MEDICINE

## 2025-03-18 PROCEDURE — 3075F SYST BP GE 130 - 139MM HG: CPT | Performed by: FAMILY MEDICINE

## 2025-03-18 PROCEDURE — 3078F DIAST BP <80 MM HG: CPT | Performed by: FAMILY MEDICINE

## 2025-03-18 PROCEDURE — 87804 INFLUENZA ASSAY W/OPTIC: CPT | Performed by: FAMILY MEDICINE

## 2025-03-18 ASSESSMENT — ENCOUNTER SYMPTOMS
CHILLS: 1
FEVER: 0
COUGH: 1
WHEEZING: 1
SHORTNESS OF BREATH: 1

## 2025-03-18 NOTE — PROGRESS NOTES
Subjective   Patient ID: Regina Reyez is a 84 y.o. female who presents for URI (Pt sick since Sunday.  No fever, cough non productive.  Cough causing sore stomach.  ).  HPI Historian(s): Self    c/o cough starting Sunday. Was out with quite a few people on Saturday. Cough keeping her awake. 2 chills, but no fever. Tried Robitussin, which helped.  COVID neg x2.     Review of Systems   Constitutional:  Positive for chills. Negative for fever.   Respiratory:  Positive for cough, shortness of breath and wheezing.    All other systems reviewed and are negative.    No LMP recorded. Patient is postmenopausal.    Patient Care Team:  KAREN Aguilar as PCP - General (Internal Medicine)  Magaly Merritt DO as PCP - [employer] PCP    Prior to Admission medications    Medication Sig Start Date End Date Taking? Authorizing Provider   albuterol (ProAir HFA) 90 mcg/actuation inhaler Inhale 2 puffs every 4 hours if needed for wheezing or shortness of breath. 8/28/24 8/28/25 Yes Magaly Merritt DO   amitriptyline (Elavil) 50 mg tablet TAKE 1 TABLET DAILY AT     BEDTIME 7/16/24  Yes Magaly Merritt DO   aspirin 81 mg EC tablet Take 1 tablet (81 mg) by mouth every other day. 10/5/15  Yes Historical Provider, MD   hydroCHLOROthiazide (HYDRODiuril) 25 mg tablet Take 1 tablet (25 mg) by mouth once daily. 1/5/25  Yes KAREN Aguilar   metoprolol succinate XL (Toprol-XL) 25 mg 24 hr tablet TAKE 1 TABLET DAILY 1/5/25  Yes KAREN Aguilar   pravastatin (Pravachol) 20 mg tablet Take 1 tablet (20 mg) by mouth once daily. 1/5/25  Yes KAREN Aguilar   Skyrizi 150 mg/mL syringe syringe Inject 1 mL (150 mg) under the skin. Every 12 weeks 7/2/21  Yes Historical Provider, MD        Objective   /78   Pulse 74   Temp 36.8 °C (98.3 °F)   SpO2 91%     {TBC Labs:01473}  {TBC Labs:82775}    Physical Exam  Vitals and nursing note reviewed.   Constitutional:       General: She is  "not in acute distress.     Appearance: Normal appearance. She is not diaphoretic.      Comments: No assistive device presently being used.  Mask.   HENT:      Head: Normocephalic and atraumatic.      Right Ear: Tympanic membrane, ear canal and external ear normal. There is no impacted cerumen.      Left Ear: Tympanic membrane, ear canal and external ear normal. There is no impacted cerumen.      Nose: Congestion and rhinorrhea present.      Mouth/Throat:      Mouth: Mucous membranes are moist.      Pharynx: Oropharynx is clear.   Eyes:      General: No scleral icterus.     Extraocular Movements: Extraocular movements intact.      Conjunctiva/sclera: Conjunctivae normal.   Cardiovascular:      Rate and Rhythm: Normal rate and regular rhythm.      Heart sounds: Normal heart sounds.   Pulmonary:      Effort: Pulmonary effort is normal. No respiratory distress.      Breath sounds: Wheezing present. No rhonchi or rales.   Musculoskeletal:      Right lower leg: No edema.      Left lower leg: No edema.   Skin:     General: Skin is warm and dry.      Coloration: Skin is not jaundiced.   Neurological:      General: No focal deficit present.      Mental Status: She is alert and oriented to person, place, and time. Mental status is at baseline.   Psychiatric:         Mood and Affect: Mood normal.         Behavior: Behavior normal.         Thought Content: Thought content normal.         Assessment & Plan  Bronchitis  With likely COPD exacerbation. CXR r/o pneumonia; r/o influenza. Inhaler, Medrol dose pack.  Orders:    azithromycin (Zithromax) 250 mg tablet; Take 2 tablets (500 mg) by mouth once daily for 1 day, THEN 1 tablet (250 mg) once daily for 4 days.    methylPREDNISolone (Medrol Dospak) 4 mg tablets; Take as directed on package.    XR chest 2 views; Future    POCT Influenza A/B manually resulted      {TBC Reviewed:78829::\"Reviewed:\"}    {TBC Declines:57016::\"Declines:\"}       "

## 2025-03-18 NOTE — PATIENT INSTRUCTIONS
Please return or seek medical attention if symptoms persist, change, worsen, or return. For emergencies, call 9-1-1 or go to the nearest Emergency Room. Please schedule additional problem-focused appointment(s) to address additional problem(s).    Avoid taking Biotin (a vitamin, shows up particularly in hair/nail supplements) for a week prior to any blood tests, as it can interfere with certain results. Fasting for labs means 12 hours, nothing to eat or drink, except water and medications, unless directed otherwise.    For assistance with scheduling referrals or consultations, please call [phone]. For laboratory, radiology, and other tests, please call [phone] ([phone] for pediatrics). Please review prescription inserts and published information for possible adverse effects of all medications. Return after testing or consultation to review results and recommendations, if symptoms persist, change, worsen, or return, or if you have any question or concern. If you do not get results within 7-10 days, or you have any question or concern, please send a message, call the office ([phone]), or return to the office for a follow-up appointment. For non-emergencies, you may call the office. For emergencies, call 9-1-1 or go to the nearest Emergency Department. Please schedule additional appointment(s) to address concern(s) not addressed today. An annual Wellness visit is strongly recommended. A Wellness visit should be dedicated to addressing routine health maintenance matters (e.g., cancer screenings, cardiovascular screening, etc.). Problem-focused visits, typically prompted by symptoms or specific concerns, are usually conducted separately, particularly if multiple or complex problems need to be addressed.    In general, results are not released or discussed over the telephone, but at an appointment or via  ethology. Results of tests done through [facility] are released via  ethology (see  below).  [email or web address]  [facility] MyChart support line: [phone]

## 2025-03-19 ENCOUNTER — HOSPITAL ENCOUNTER (OUTPATIENT)
Dept: RADIOLOGY | Facility: CLINIC | Age: 85
Discharge: HOME | End: 2025-03-19
Payer: MEDICARE

## 2025-03-19 DIAGNOSIS — R30.0 DYSURIA: ICD-10-CM

## 2025-03-19 DIAGNOSIS — J40 BRONCHITIS: ICD-10-CM

## 2025-03-19 LAB
POC RAPID INFLUENZA A: NEGATIVE
POC RAPID INFLUENZA B: NEGATIVE

## 2025-03-19 PROCEDURE — 71046 X-RAY EXAM CHEST 2 VIEWS: CPT | Performed by: RADIOLOGY

## 2025-03-19 PROCEDURE — 71046 X-RAY EXAM CHEST 2 VIEWS: CPT

## 2025-03-19 RX ORDER — AZITHROMYCIN 250 MG/1
TABLET, FILM COATED ORAL
Qty: 6 TABLET | Refills: 0 | Status: SHIPPED | OUTPATIENT
Start: 2025-03-19 | End: 2025-03-24

## 2025-03-19 RX ORDER — METHYLPREDNISOLONE 4 MG/1
TABLET ORAL
Qty: 21 TABLET | Refills: 0 | Status: SHIPPED | OUTPATIENT
Start: 2025-03-19

## 2025-03-20 ENCOUNTER — APPOINTMENT (OUTPATIENT)
Dept: UROLOGY | Facility: CLINIC | Age: 85
End: 2025-03-20
Payer: MEDICARE

## 2025-03-20 DIAGNOSIS — R30.0 DYSURIA: ICD-10-CM

## 2025-03-21 ENCOUNTER — TELEPHONE (OUTPATIENT)
Dept: PRIMARY CARE | Facility: CLINIC | Age: 85
End: 2025-03-21
Payer: MEDICARE

## 2025-03-21 DIAGNOSIS — R30.0 DYSURIA: ICD-10-CM

## 2025-03-21 NOTE — TELEPHONE ENCOUNTER
Pt calling she got notice that her CXR results are back but she can't get into my chart to see them. She would like to get a call today with her results.

## 2025-03-21 NOTE — TELEPHONE ENCOUNTER
Can you please call patient and please update her that chest x-ray  have only just resulted.  No signs of pneumonia or other acute pathology.  Please have her let us know for any persistent/worsening respiratory concerns

## 2025-04-09 LAB
APPEARANCE UR: CLEAR
BACTERIA #/AREA URNS HPF: ABNORMAL /HPF
BACTERIA UR CULT: NORMAL
BILIRUB UR QL STRIP: NEGATIVE
COLOR UR: YELLOW
GLUCOSE UR QL STRIP: NEGATIVE
HGB UR QL STRIP: NEGATIVE
HYALINE CASTS #/AREA URNS LPF: ABNORMAL /LPF
KETONES UR QL STRIP: NEGATIVE
LEUKOCYTE ESTERASE UR QL STRIP: ABNORMAL
NITRITE UR QL STRIP: NEGATIVE
PH UR STRIP: 5.5 [PH] (ref 5–8)
PROT UR QL STRIP: NEGATIVE
RBC #/AREA URNS HPF: ABNORMAL /HPF
SERVICE CMNT-IMP: ABNORMAL
SP GR UR STRIP: 1.01 (ref 1–1.03)
SQUAMOUS #/AREA URNS HPF: ABNORMAL /HPF
WBC #/AREA URNS HPF: ABNORMAL /HPF

## 2025-04-10 ENCOUNTER — APPOINTMENT (OUTPATIENT)
Dept: UROLOGY | Facility: CLINIC | Age: 85
End: 2025-04-10
Payer: MEDICARE

## 2025-04-10 DIAGNOSIS — N39.44 NOCTURNAL ENURESIS: ICD-10-CM

## 2025-04-10 DIAGNOSIS — N32.81 OAB (OVERACTIVE BLADDER): Primary | ICD-10-CM

## 2025-04-10 PROCEDURE — 1159F MED LIST DOCD IN RCRD: CPT | Performed by: STUDENT IN AN ORGANIZED HEALTH CARE EDUCATION/TRAINING PROGRAM

## 2025-04-10 PROCEDURE — 99213 OFFICE O/P EST LOW 20 MIN: CPT | Performed by: STUDENT IN AN ORGANIZED HEALTH CARE EDUCATION/TRAINING PROGRAM

## 2025-04-10 ASSESSMENT — COLUMBIA-SUICIDE SEVERITY RATING SCALE - C-SSRS
2. HAVE YOU ACTUALLY HAD ANY THOUGHTS OF KILLING YOURSELF?: NO
1. IN THE PAST MONTH, HAVE YOU WISHED YOU WERE DEAD OR WISHED YOU COULD GO TO SLEEP AND NOT WAKE UP?: NO
6. HAVE YOU EVER DONE ANYTHING, STARTED TO DO ANYTHING, OR PREPARED TO DO ANYTHING TO END YOUR LIFE?: NO

## 2025-04-11 NOTE — PROGRESS NOTES
HISTORY OF PRESENT ILLNESS:  Here to have device reprogrammed, sx the same          Past Medical History  She has a past medical history of Aneurysm of thoracic aorta, Arthritis, COPD (chronic obstructive pulmonary disease) (Multi), Encounter for immunization (10/08/2018), Encounter for screening for other viral diseases (10/08/2018), Full dentures, Heart murmur, History of acute bronchitis (10/17/2019), History of appendicitis (10/24/2019), History of cataract (04/02/2015), History of dyspnea (08/19/2019), History of influenza vaccination (10/24/2019), Hyperlipidemia, Hypertension, Murmur, cardiac, Nausea (10/18/2019), Nontoxic multinodular goiter, OAB (overactive bladder), Perinephric fluid collection (07/29/2019), Phlebitis and thrombophlebitis of other sites (10/24/2019), Pulmonary emphysema (Multi), Radial styloid tenosynovitis (de quervain) (04/12/2016), Right upper quadrant pain (04/08/2016), Screening for malignant neoplasms, colon, Tubulo-interstitial nephritis, 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    Surgical History  She has a past surgical history that includes Tonsillectomy (09/20/2019); Trigger finger release (09/20/2019); Colonoscopy (10/08/2013); US guided thyroid biopsy (08/06/2019); Appendectomy; Cataract extraction; Thumb arthroscopy (Right); Bladder surgery (07/24/2024); and Breast biopsy (Bilateral).     Social History  She reports that she quit smoking about 41 years ago. Her smoking use included cigarettes. She has never used smokeless tobacco. She reports that she does not currently use alcohol. She reports that she does not use drugs.    Family History  Family History   Problem Relation Name Age of Onset    Leukemia Mother  96    Alcohol abuse Father      COPD Father  72        Allergies  Atorvastatin      A comprehensive 10+ review of systems was negative except for: see hpi                          PHYSICAL EXAMINATION:  BP Readings from Last 3  "Encounters:   03/18/25 132/78   12/10/24 128/76   08/28/24 132/80      Wt Readings from Last 3 Encounters:   12/10/24 81.2 kg (179 lb)   10/29/24 83.9 kg (185 lb)   08/28/24 80.3 kg (177 lb)      BMI: Estimated body mass index is 28.89 kg/m² as calculated from the following:    Height as of 12/10/24: 1.676 m (5' 6\").    Weight as of 12/10/24: 81.2 kg (179 lb).  BSA: Estimated body surface area is 1.94 meters squared as calculated from the following:    Height as of 12/10/24: 1.676 m (5' 6\").    Weight as of 12/10/24: 81.2 kg (179 lb).  HEENT: Normocephalic, atraumatic, PER EOMI, nonicteric, trachea normal, thyroid normal, oropharynx normal.  CARDIAC: regular rate & rhythm, S1 & S2 normal.  No heaves, thrills, gallops or murmurs.  LUNGS: Clear to auscultation, no spinal or CV tenderness.  EXTREMITIES: No evidence of cyanosis, clubbing or edema.               Assessment:  84-year-old with urinary urge incontinence, overactive bladder and nocturia     OAB/Nocturia:  Failed gemtesa  UDS: normal emptying no stress incontinence  SNM  8/5/24, >90% improvement, sx seemed to have returned  Back to baseline, programing changed 3/6/25 to program 1    Reprogrammed today  F/up in 6-8 weeks, consider adding botox other meds or lead revision      Follow up in 4 weeks , interrogate device with [employer] team     Consider adding a med or botox   Reed Garcia MD     "

## 2025-05-01 ENCOUNTER — HOSPITAL ENCOUNTER (OUTPATIENT)
Dept: RADIOLOGY | Facility: HOSPITAL | Age: 85
Discharge: HOME | End: 2025-05-01
Payer: MEDICARE

## 2025-05-01 DIAGNOSIS — R92.8 ABNORMAL MAMMOGRAM: ICD-10-CM

## 2025-05-01 PROCEDURE — 77065 DX MAMMO INCL CAD UNI: CPT | Mod: RT

## 2025-05-22 ENCOUNTER — APPOINTMENT (OUTPATIENT)
Dept: UROLOGY | Facility: CLINIC | Age: 85
End: 2025-05-22
Payer: MEDICARE

## 2025-05-22 DIAGNOSIS — R35.1 NOCTURIA: ICD-10-CM

## 2025-05-22 DIAGNOSIS — N32.81 OAB (OVERACTIVE BLADDER): Primary | ICD-10-CM

## 2025-05-22 PROCEDURE — G2211 COMPLEX E/M VISIT ADD ON: HCPCS | Performed by: STUDENT IN AN ORGANIZED HEALTH CARE EDUCATION/TRAINING PROGRAM

## 2025-05-22 PROCEDURE — 99214 OFFICE O/P EST MOD 30 MIN: CPT | Performed by: STUDENT IN AN ORGANIZED HEALTH CARE EDUCATION/TRAINING PROGRAM

## 2025-05-22 RX ORDER — FESOTERODINE FUMARATE 4 MG/1
4 TABLET, FILM COATED, EXTENDED RELEASE ORAL DAILY
Qty: 30 TABLET | Refills: 2 | Status: SHIPPED | OUTPATIENT
Start: 2025-05-22 | End: 2025-08-20

## 2025-05-22 NOTE — PROGRESS NOTES
HISTORY OF PRESENT ILLNESS:  Regina Reyez is a 84 y.o. female who presents today for a follow up visit. She denies any stress incontinence. She does have urge incontinence. She has had improvement, just not as much as she likes.           Past Medical History  She has a past medical history of Aneurysm of thoracic aorta, Arthritis, COPD (chronic obstructive pulmonary disease) (Multi), Encounter for immunization (10/08/2018), Encounter for screening for other viral diseases (10/08/2018), Full dentures, Heart murmur, History of acute bronchitis (10/17/2019), History of appendicitis (10/24/2019), History of cataract (04/02/2015), History of dyspnea (08/19/2019), History of influenza vaccination (10/24/2019), Hyperlipidemia, Hypertension, Murmur, cardiac, Nausea (10/18/2019), Nontoxic multinodular goiter, OAB (overactive bladder), Perinephric fluid collection (07/29/2019), Phlebitis and thrombophlebitis of other sites (10/24/2019), Pulmonary emphysema (Multi), Radial styloid tenosynovitis (de quervain) (04/12/2016), Right upper quadrant pain (04/08/2016), Screening for malignant neoplasms, colon, Tubulo-interstitial nephritis, 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    Surgical History  She has a past surgical history that includes Tonsillectomy (09/20/2019); Trigger finger release (09/20/2019); Colonoscopy (10/08/2013); US guided thyroid biopsy (08/06/2019); Appendectomy; Cataract extraction; Thumb arthroscopy (Right); Bladder surgery (07/24/2024); and Breast biopsy (Bilateral).     Social History  She reports that she quit smoking about 41 years ago. Her smoking use included cigarettes. She has never used smokeless tobacco. She reports that she does not currently use alcohol. She reports that she does not use drugs.    Family History  Family History[1]     Allergies  Atorvastatin      A comprehensive 10+ review of systems was negative except for: see hpi                         "  PHYSICAL EXAMINATION:  BP Readings from Last 3 Encounters:   03/18/25 132/78   12/10/24 128/76   08/28/24 132/80      Wt Readings from Last 3 Encounters:   12/10/24 81.2 kg (179 lb)   10/29/24 83.9 kg (185 lb)   08/28/24 80.3 kg (177 lb)      BMI: Estimated body mass index is 28.89 kg/m² as calculated from the following:    Height as of 12/10/24: 1.676 m (5' 6\").    Weight as of 12/10/24: 81.2 kg (179 lb).  BSA: Estimated body surface area is 1.94 meters squared as calculated from the following:    Height as of 12/10/24: 1.676 m (5' 6\").    Weight as of 12/10/24: 81.2 kg (179 lb).  HEENT: Normocephalic, atraumatic, PER EOMI, nonicteric, trachea normal, thyroid normal, oropharynx normal.  CARDIAC: regular rate & rhythm, S1 & S2 normal.  No heaves, thrills, gallops or murmurs.  LUNGS: Clear to auscultation, no spinal or CV tenderness.  EXTREMITIES: No evidence of cyanosis, clubbing or edema.               Assessment:  84-year-old with urinary urge incontinence, overactive bladder and nocturia     OAB/Nocturia:  Failed gemtesa  UDS: normal emptying no stress incontinence  SNM  8/5/24, >90% improvement, sx seemed to have returned  Only about 50% improved     -interrogate device with [employer] team, changed settings today    -consider adding botox vs DDAVP     -trial of toviaz        Follow up in August        All questions and concerns were answered and addressed.  The patient expressed understanding and agrees with the plan.     Reed Garcia MD    Scribe Attestation  By signing my name below, I, Magdalenafabricio Guerin, Ary   attest that this documentation has been prepared under the direction and in the presence of Reed Garcia MD.         [1]   Family History  Problem Relation Name Age of Onset    Leukemia Mother  96    Alcohol abuse Father      COPD Father  72     "

## 2025-06-10 ENCOUNTER — APPOINTMENT (OUTPATIENT)
Dept: PRIMARY CARE | Facility: CLINIC | Age: 85
End: 2025-06-10
Payer: MEDICARE

## 2025-06-10 VITALS
OXYGEN SATURATION: 95 % | SYSTOLIC BLOOD PRESSURE: 112 MMHG | BODY MASS INDEX: 29.02 KG/M2 | WEIGHT: 180.6 LBS | DIASTOLIC BLOOD PRESSURE: 60 MMHG | HEART RATE: 87 BPM | HEIGHT: 66 IN

## 2025-06-10 DIAGNOSIS — Z79.899 ON LONG TERM DRUG THERAPY: ICD-10-CM

## 2025-06-10 DIAGNOSIS — R01.1 HEART MURMUR: ICD-10-CM

## 2025-06-10 DIAGNOSIS — Z13.21 ENCOUNTER FOR VITAMIN DEFICIENCY SCREENING: ICD-10-CM

## 2025-06-10 DIAGNOSIS — Z13.220 SCREENING FOR CHOLESTEROL LEVEL: ICD-10-CM

## 2025-06-10 DIAGNOSIS — Z13.220 ENCOUNTER FOR LIPID SCREENING FOR CARDIOVASCULAR DISEASE: ICD-10-CM

## 2025-06-10 DIAGNOSIS — Z13.29 SCREENING FOR THYROID DISORDER: ICD-10-CM

## 2025-06-10 DIAGNOSIS — E78.00 PURE HYPERCHOLESTEROLEMIA: ICD-10-CM

## 2025-06-10 DIAGNOSIS — Z13.1 SCREENING FOR DIABETES MELLITUS: ICD-10-CM

## 2025-06-10 DIAGNOSIS — Z00.00 ROUTINE GENERAL MEDICAL EXAMINATION AT HEALTH CARE FACILITY: Primary | ICD-10-CM

## 2025-06-10 DIAGNOSIS — R73.09 BLOOD GLUCOSE ABNORMAL: ICD-10-CM

## 2025-06-10 DIAGNOSIS — E55.9 VITAMIN D DEFICIENCY: ICD-10-CM

## 2025-06-10 DIAGNOSIS — I10 ESSENTIAL HYPERTENSION: ICD-10-CM

## 2025-06-10 DIAGNOSIS — Z13.6 ENCOUNTER FOR LIPID SCREENING FOR CARDIOVASCULAR DISEASE: ICD-10-CM

## 2025-06-10 DIAGNOSIS — C44.91 BASAL CELL CARCINOMA (BCC), UNSPECIFIED SITE: ICD-10-CM

## 2025-06-10 DIAGNOSIS — F41.9 ANXIETY: ICD-10-CM

## 2025-06-10 DIAGNOSIS — H61.23 EXCESSIVE CERUMEN IN BOTH EAR CANALS: ICD-10-CM

## 2025-06-10 DIAGNOSIS — J44.9 COPD, MODERATE (MULTI): ICD-10-CM

## 2025-06-10 DIAGNOSIS — I71.21 ANEURYSM OF ASCENDING AORTA WITHOUT RUPTURE: ICD-10-CM

## 2025-06-10 DIAGNOSIS — M85.89 OSTEOPENIA OF MULTIPLE SITES: ICD-10-CM

## 2025-06-10 DIAGNOSIS — Z00.00 ROUTINE ADULT HEALTH MAINTENANCE: ICD-10-CM

## 2025-06-10 PROBLEM — H26.9 CATARACT: Status: RESOLVED | Noted: 2024-03-28 | Resolved: 2025-06-10

## 2025-06-10 PROBLEM — H90.3 SENSORINEURAL HEARING LOSS, BILATERAL: Status: RESOLVED | Noted: 2021-04-20 | Resolved: 2025-06-10

## 2025-06-10 PROBLEM — H90.3 ASYMMETRICAL SENSORINEURAL HEARING LOSS: Status: RESOLVED | Noted: 2021-04-20 | Resolved: 2025-06-10

## 2025-06-10 PROCEDURE — 3074F SYST BP LT 130 MM HG: CPT | Performed by: NURSE PRACTITIONER

## 2025-06-10 PROCEDURE — 69209 REMOVE IMPACTED EAR WAX UNI: CPT | Performed by: NURSE PRACTITIONER

## 2025-06-10 PROCEDURE — G0439 PPPS, SUBSEQ VISIT: HCPCS | Performed by: NURSE PRACTITIONER

## 2025-06-10 PROCEDURE — 3078F DIAST BP <80 MM HG: CPT | Performed by: NURSE PRACTITIONER

## 2025-06-10 PROCEDURE — 99214 OFFICE O/P EST MOD 30 MIN: CPT | Performed by: NURSE PRACTITIONER

## 2025-06-10 PROCEDURE — 1158F ADVNC CARE PLAN TLK DOCD: CPT | Performed by: NURSE PRACTITIONER

## 2025-06-10 PROCEDURE — 1036F TOBACCO NON-USER: CPT | Performed by: NURSE PRACTITIONER

## 2025-06-10 PROCEDURE — 1123F ACP DISCUSS/DSCN MKR DOCD: CPT | Performed by: NURSE PRACTITIONER

## 2025-06-10 PROCEDURE — 1170F FXNL STATUS ASSESSED: CPT | Performed by: NURSE PRACTITIONER

## 2025-06-10 PROCEDURE — 1159F MED LIST DOCD IN RCRD: CPT | Performed by: NURSE PRACTITIONER

## 2025-06-10 ASSESSMENT — ENCOUNTER SYMPTOMS
DIFFICULTY URINATING: 0
FEVER: 0
LOSS OF SENSATION IN FEET: 0
CHILLS: 0
DEPRESSION: 0
OCCASIONAL FEELINGS OF UNSTEADINESS: 0
HEADACHES: 0
JOINT SWELLING: 0
DIARRHEA: 0
EYE PAIN: 0
ABDOMINAL PAIN: 0
WEAKNESS: 0
ABDOMINAL DISTENTION: 0
NAUSEA: 0
COUGH: 0
COLOR CHANGE: 0
ADENOPATHY: 0
BRUISES/BLEEDS EASILY: 0
SEIZURES: 0
BACK PAIN: 0
PALPITATIONS: 0
CONSTIPATION: 1
WHEEZING: 0
DIZZINESS: 0
MYALGIAS: 0
SHORTNESS OF BREATH: 1
TROUBLE SWALLOWING: 0
WOUND: 0
FATIGUE: 0

## 2025-06-10 ASSESSMENT — ACTIVITIES OF DAILY LIVING (ADL)
TAKING_MEDICATION: INDEPENDENT
BATHING: INDEPENDENT
DRESSING: INDEPENDENT
DOING_HOUSEWORK: INDEPENDENT
GROCERY_SHOPPING: INDEPENDENT
MANAGING_FINANCES: INDEPENDENT

## 2025-06-10 NOTE — ASSESSMENT & PLAN NOTE
Appears relatively stable however patient encouraged to utilize as needed rescue inhaler for increased wheezing or shortness of breath.  Provider to be notified for any new/worsening respiratory concerns

## 2025-06-10 NOTE — PROGRESS NOTES
Subjective   Patient ID: Regina Reyez is a 85 y.o. female who presents for Medicare Annual Wellness Visit Subsequent.    Patient seen today in order to complete an annual Medicare wellness exam. Patient is a retired  who is .  Her son currently lives with her and she remains very independent.  Patient states that she stays busy in her [employer] and community, going out frequently with friends and family.  Although patient continues to report episodes of grief related to her , she feels like her anxiety is well-controlled with current medication.  No significant issues with depression but some nocturia related insomnia reported.  Overall good support system reported.  Patient ambulates independently and denies any recent falls.  She is now going to [employer] twice a week for balance issues.  No reported shortness of breath or chest pain with exertion  No reported issues with appetite or staying hydrated.  Occasional constipation is relieved with prunes.  Patient recently undergone a bladder neurostimulator to help with her overactive bladder symptoms.  She continues to work with the [employer] team regarding settings as OAB symptoms persist.  Patient has a history of hypertension which appears to be well-controlled.  No reported issues with chest pain/pressure, headaches, blurred vision or other cardiac concerns.  She follows routinely with dermatology for history of skin cancer and psoriasis.  Patient states that psoriasis is well-controlled with routine Skyrizi treatments.  Discussed diagnosis of COPD.  Patient does admit to occasional shortness of breath but has not used her rescue inhaler as of late.  No reported coughing, wheezing or other respiratory concerns.    Patient reports that she is an annual study through the [facility] to monitor physical changes that may occur at start of dementia.  No current cognitive impairments noted at this time.  No significant issues with  vision or dentition.  Medications reviewed.  No other acute concerns voiced at this time.           Current Outpatient Medications on File Prior to Visit   Medication Sig Dispense Refill    albuterol (ProAir HFA) 90 mcg/actuation inhaler Inhale 2 puffs every 4 hours if needed for wheezing or shortness of breath. 8.5 g 5    amitriptyline (Elavil) 50 mg tablet TAKE 1 TABLET DAILY AT     BEDTIME 90 tablet 3    aspirin 81 mg EC tablet Take 1 tablet (81 mg) by mouth every other day.      fesoterodine (Toviaz) 4 mg tablet extended release 24 hr Take 1 tablet (4 mg) by mouth once daily. 30 tablet 2    hydroCHLOROthiazide (HYDRODiuril) 25 mg tablet Take 1 tablet (25 mg) by mouth once daily. 90 tablet 1    metoprolol succinate XL (Toprol-XL) 25 mg 24 hr tablet TAKE 1 TABLET DAILY 90 tablet 1    pravastatin (Pravachol) 20 mg tablet Take 1 tablet (20 mg) by mouth once daily. 90 tablet 1    Skyrizi 150 mg/mL syringe syringe Inject 1 mL (150 mg) under the skin. Every 12 weeks      [DISCONTINUED] methylPREDNISolone (Medrol Dospak) 4 mg tablets Take as directed on package. (Patient not taking: Reported on 6/10/2025) 21 tablet 0     No current facility-administered medications on file prior to visit.       Past Medical History:   Diagnosis Date    Aneurysm of thoracic aorta     4.3cm on CT 1/23    Arthritis     COPD (chronic obstructive pulmonary disease) (Multi)     Encounter for immunization 10/08/2018    Need for tetanus booster    Encounter for screening for other viral diseases 10/08/2018    Need for hepatitis C screening test    Full dentures     upper only    Heart murmur     History of acute bronchitis 10/17/2019    History of appendicitis 10/24/2019    History of cataract 04/02/2015    History of dyspnea 08/19/2019    History of influenza vaccination 10/24/2019    Hyperlipidemia     Hypertension     Murmur, cardiac     Nausea 10/18/2019    Nausea in adult    Nontoxic multinodular goiter     OAB (overactive bladder)      Perinephric fluid collection 07/29/2019    Phlebitis and thrombophlebitis of other sites 10/24/2019    Phlebitis of arm    Pulmonary emphysema (Multi)     Radial styloid tenosynovitis (de quervain) 04/12/2016    De Quervain's tenosynovitis    Right upper quadrant pain 04/08/2016    Postprandial RUQ pain    Screening for malignant neoplasms, colon     Screening for malignant neoplasms, colon    Tubulo-interstitial nephritis, not specified as acute or chronic 07/29/2019    Pyelonephritis of right kidney    Urinary incontinence         Past Surgical History:   Procedure Laterality Date    APPENDECTOMY      BLADDER SURGERY  07/24/2024    STAGE 1 STIMULATOR PLACEMENT    BREAST BIOPSY Bilateral     benign bilat bx. pt does not remember when    CATARACT EXTRACTION      COLONOSCOPY  10/08/2013    Complete Colonoscopy    THUMB ARTHROSCOPY Right     TONSILLECTOMY  09/20/2019    TRIGGER FINGER RELEASE  09/20/2019    US GUIDED THYROID BIOPSY  08/06/2019    US GUIDED THYROID BIOPSY 8/6/2019 [facility]        Family History   Problem Relation Name Age of Onset    Leukemia Mother  96    Alcohol abuse Father      COPD Father  72        Review of Systems   Constitutional:  Negative for chills, fatigue and fever.   HENT:  Negative for dental problem and trouble swallowing.    Eyes:  Negative for pain and visual disturbance.        Wears glasses   Respiratory:  Positive for shortness of breath. Negative for cough and wheezing.         Reports occasional shortness of breath.  COPD history   Cardiovascular:  Negative for chest pain, palpitations and leg swelling.   Gastrointestinal:  Positive for constipation. Negative for abdominal distention, abdominal pain, diarrhea and nausea.        Positive for occasion constipation   Endocrine: Negative for cold intolerance and heat intolerance.   Genitourinary:  Negative for difficulty urinating.        Positive for bladder nerve stimulator placement for OAB   Musculoskeletal:  Negative for  "back pain, gait problem, joint swelling and myalgias.   Skin:  Negative for color change, pallor, rash and wound.        Positive for history for skin cancer and psoriasis   Allergic/Immunologic: Negative for environmental allergies and food allergies.   Neurological:  Negative for dizziness, seizures, weakness and headaches.   Hematological:  Negative for adenopathy. Does not bruise/bleed easily.   Psychiatric/Behavioral:  Negative for behavioral problems.         Positive for occasional grief and anxiety (well controlled)   All other systems reviewed and are negative.      Objective   /60 (BP Location: Right arm, Patient Position: Sitting)   Pulse 87   Ht 1.676 m (5' 6\")   Wt 81.9 kg (180 lb 9.6 oz)   SpO2 95%   BMI 29.15 kg/m²     Physical Exam  Constitutional:       General: She is not in acute distress.     Appearance: Normal appearance. She is not toxic-appearing.   HENT:      Head: Normocephalic and atraumatic.      Right Ear: Ear canal and external ear normal. There is impacted cerumen.      Left Ear: Ear canal and external ear normal. There is impacted cerumen.      Ears:      Comments: Moderate cerumen in bilateral ear canals     Nose: Nose normal.      Mouth/Throat:      Mouth: Mucous membranes are moist.      Pharynx: Oropharynx is clear.   Eyes:      Extraocular Movements: Extraocular movements intact.      Conjunctiva/sclera: Conjunctivae normal.      Pupils: Pupils are equal, round, and reactive to light.   Cardiovascular:      Rate and Rhythm: Normal rate and regular rhythm.      Pulses: Normal pulses.      Heart sounds: Murmur heard.   Pulmonary:      Effort: Pulmonary effort is normal.      Breath sounds: Normal breath sounds. No wheezing.   Abdominal:      General: Bowel sounds are normal.      Palpations: Abdomen is soft.   Musculoskeletal:         General: No swelling.      Cervical back: Normal range of motion and neck supple.   Skin:     General: Skin is warm and dry.      Comments: " "Skin lesion to right forearm and to the right side of patient's nose   Neurological:      General: No focal deficit present.      Mental Status: She is alert and oriented to person, place, and time. Mental status is at baseline.      Cranial Nerves: No cranial nerve deficit.      Motor: No weakness.   Psychiatric:         Mood and Affect: Mood normal.         Behavior: Behavior normal.         Thought Content: Thought content normal.         Judgment: Judgment normal.         Assessment/Plan   Problem List Items Addressed This Visit           ICD-10-CM    COPD, moderate (Multi) J44.9    Asymptomatic.  Patient reports that she does not use rescue inhaler         Relevant Orders    Comprehensive Metabolic Panel    CBC and Auto Differential    Essential hypertension I10    Stable on current medication regiment.  Will continue to monitor vital signs at subsequent visits.  Provider to be notified for any new cardiac concerns.         Thoracic aortic aneurysm without rupture I71.20    Most recent CT angio of the chest completed 2024 and showed \"Stable dilation of the ascending thoracic aorta measuring up to 4.3 cm at its maximal diameter. This appears relatively unchanged compared to previous CT angio of the chest from 01/25/2023. Consider follow-up surveillance CT angio of the chest and or MRA of the chest within 24-36 months\".  Discussed results with patient who is agreeable for follow-up imaging in 2 years.         Routine adult health maintenance Z00.00    Relevant Orders    Comprehensive Metabolic Panel    TSH with reflex to Free T4 if abnormal    CBC and Auto Differential    Lipid Panel Non-Fasting     Other Visit Diagnoses         Codes      Screening for diabetes mellitus    -  Primary Z13.1    Relevant Orders    Hemoglobin A1C      Blood glucose abnormal     R73.09    Relevant Orders    Hemoglobin A1C      Screening for thyroid disorder     Z13.29    Relevant Orders    TSH with reflex to Free T4 if abnormal      " Screening for cholesterol level     Z13.220      Encounter for lipid screening for cardiovascular disease     Z13.220, Z13.6    Relevant Orders    Lipid Panel Non-Fasting      Encounter for vitamin deficiency screening     Z13.21    Relevant Orders    Vitamin D 25-Hydroxy,Total (for eval of Vitamin D levels)      On long term drug therapy     Z79.899    Relevant Orders    Vitamin D 25-Hydroxy,Total (for eval of Vitamin D levels)      Vitamin D deficiency     E55.9    Relevant Orders    Vitamin D 25-Hydroxy,Total (for eval of Vitamin D levels)      Excessive cerumen in both ear canals     H61.23

## 2025-06-10 NOTE — PROGRESS NOTES
Patient ID: Regina Reyez is a 85 y.o. female.    Ear Cerumen Removal    Date/Time: 6/10/2025 10:35 AM    Performed by: Aidee Strauss MA  Authorized by: KAREN Aguilar    Consent:     Consent obtained:  Verbal    Consent given by:  Patient    Risks, benefits, and alternatives were discussed: yes      Risks discussed:  Bleeding, infection, pain, TM perforation, incomplete removal and dizziness    Alternatives discussed:  Alternative treatment  Universal protocol:     Procedure explained and questions answered to patient or proxy's satisfaction: yes      Patient identity confirmed:  Verbally with patient  Procedure details:     Location:  L ear and R ear    Procedure type: irrigation      Procedure outcomes: unable to remove cerumen    Post-procedure details:     Inspection:  Some cerumen remaining    Hearing quality:  Improved    Procedure completion:  Tolerated well, no immediate complications

## 2025-06-10 NOTE — ASSESSMENT & PLAN NOTE
"Most recent CT angio of the chest completed 2024 and showed \"Stable dilation of the ascending thoracic aorta measuring up to 4.3 cm at its maximal diameter. This appears relatively unchanged compared to previous CT angio of the chest from 01/25/2023. Consider follow-up surveillance CT angio of the chest and or MRA of the chest within 24-36 months\".  Discussed results with patient who is agreeable for follow-up imaging in 2 years.  "

## 2025-06-10 NOTE — PATIENT INSTRUCTIONS
Please arrange for routine follow up in 6 months. You may schedule on-line through [facility] or call our office at [phone].

## 2025-06-10 NOTE — ASSESSMENT & PLAN NOTE
"Most recent blood work reviewed with patient.  Will continue to monitor as appropriate.  -No recent colonoscopy on file?  Previous primary care provider completed fecal occult blood testing in 2024 which was negative.  Patient is currently asymptomatic for bowel concerns  - Will plan on annual mammograms in November  -Most recent bone density completed in 2020 and showed osteopenia.  Will discuss repeat DEXA screening at subsequent visits.  -\"Stable dilation of the ascending thoracic aorta measuring up to 4.3 cm at its maximal diameter. This appears relatively unchanged compared to previous CT angio of the chest from 01/25/2023. Consider follow-up surveillance CT angio of the chest and or MRA of the chest within 24-36 months\".  "

## 2025-06-10 NOTE — ASSESSMENT & PLAN NOTE
Reportedly stable on current medication regiment.  Good support system reported overall.  Patient to notify provider for any persistent/worsening mood concerns.

## 2025-06-10 NOTE — ASSESSMENT & PLAN NOTE
Most recent bone density completed in 2020 and showed osteopenia.  Will discuss repeat DEXA screening at subsequent visits.

## 2025-06-10 NOTE — ASSESSMENT & PLAN NOTE
Patient underwent surgical placement of a bladder nerve stimulator for OAB.  She is to continue to follow with urology for continued evaluation and treatment recommendations if symptoms persist.  No current infection concerns.

## 2025-06-13 ENCOUNTER — CLINICAL SUPPORT (OUTPATIENT)
Dept: PRIMARY CARE | Facility: CLINIC | Age: 85
End: 2025-06-13
Payer: MEDICARE

## 2025-06-13 DIAGNOSIS — H61.23 EXCESSIVE CERUMEN IN BOTH EAR CANALS: ICD-10-CM

## 2025-06-13 PROCEDURE — 69209 REMOVE IMPACTED EAR WAX UNI: CPT | Performed by: NURSE PRACTITIONER

## 2025-06-13 PROCEDURE — 99211 OFF/OP EST MAY X REQ PHY/QHP: CPT | Performed by: NURSE PRACTITIONER

## 2025-06-13 NOTE — PROGRESS NOTES
Patient ID: Regina Reyez is a 85 y.o. female.    Ear Cerumen Removal    Date/Time: 6/13/2025 3:42 PM    Performed by: Aidee Strauss MA  Authorized by: KAREN Aguilar    Consent:     Consent obtained:  Verbal    Consent given by:  Patient    Risks, benefits, and alternatives were discussed: yes      Risks discussed:  Bleeding, infection, TM perforation, incomplete removal, pain and dizziness    Alternatives discussed:  Alternative treatment  Universal protocol:     Procedure explained and questions answered to patient or proxy's satisfaction: yes      Patient identity confirmed:  Verbally with patient  Procedure details:     Location:  L ear and R ear    Procedure type: irrigation      Procedure outcomes: cerumen removed    Post-procedure details:     Inspection:  Ear canal clear    Hearing quality:  Improved    Procedure completion:  Tolerated

## 2025-06-14 LAB
25(OH)D3+25(OH)D2 SERPL-MCNC: 52 NG/ML (ref 30–100)
ALBUMIN SERPL-MCNC: 4.4 G/DL (ref 3.6–5.1)
ALP SERPL-CCNC: 55 U/L (ref 37–153)
ALT SERPL-CCNC: 23 U/L (ref 6–29)
ANION GAP SERPL CALCULATED.4IONS-SCNC: 9 MMOL/L (CALC) (ref 7–17)
AST SERPL-CCNC: 26 U/L (ref 10–35)
BASOPHILS # BLD AUTO: 32 CELLS/UL (ref 0–200)
BASOPHILS NFR BLD AUTO: 0.5 %
BILIRUB SERPL-MCNC: 0.4 MG/DL (ref 0.2–1.2)
BUN SERPL-MCNC: 22 MG/DL (ref 7–25)
CALCIUM SERPL-MCNC: 9.6 MG/DL (ref 8.6–10.4)
CHLORIDE SERPL-SCNC: 97 MMOL/L (ref 98–110)
CHOLEST SERPL-MCNC: 174 MG/DL
CHOLEST/HDLC SERPL: 2.7 (CALC)
CO2 SERPL-SCNC: 32 MMOL/L (ref 20–32)
CREAT SERPL-MCNC: 0.7 MG/DL (ref 0.6–0.95)
EGFRCR SERPLBLD CKD-EPI 2021: 84 ML/MIN/1.73M2
EOSINOPHIL # BLD AUTO: 208 CELLS/UL (ref 15–500)
EOSINOPHIL NFR BLD AUTO: 3.3 %
ERYTHROCYTE [DISTWIDTH] IN BLOOD BY AUTOMATED COUNT: 13.4 % (ref 11–15)
EST. AVERAGE GLUCOSE BLD GHB EST-MCNC: 137 MG/DL
EST. AVERAGE GLUCOSE BLD GHB EST-SCNC: 7.6 MMOL/L
GLUCOSE SERPL-MCNC: 99 MG/DL (ref 65–139)
HBA1C MFR BLD: 6.4 %
HCT VFR BLD AUTO: 44.5 % (ref 35–45)
HDLC SERPL-MCNC: 64 MG/DL
HGB BLD-MCNC: 14.7 G/DL (ref 11.7–15.5)
LDLC SERPL CALC-MCNC: 82 MG/DL (CALC)
LYMPHOCYTES # BLD AUTO: 1846 CELLS/UL (ref 850–3900)
LYMPHOCYTES NFR BLD AUTO: 29.3 %
MCH RBC QN AUTO: 31.5 PG (ref 27–33)
MCHC RBC AUTO-ENTMCNC: 33 G/DL (ref 32–36)
MCV RBC AUTO: 95.3 FL (ref 80–100)
MONOCYTES # BLD AUTO: 788 CELLS/UL (ref 200–950)
MONOCYTES NFR BLD AUTO: 12.5 %
NEUTROPHILS # BLD AUTO: 3427 CELLS/UL (ref 1500–7800)
NEUTROPHILS NFR BLD AUTO: 54.4 %
NONHDLC SERPL-MCNC: 110 MG/DL (CALC)
PLATELET # BLD AUTO: 368 THOUSAND/UL (ref 140–400)
PMV BLD REES-ECKER: 9.8 FL (ref 7.5–12.5)
POTASSIUM SERPL-SCNC: 3.9 MMOL/L (ref 3.5–5.3)
PROT SERPL-MCNC: 7.1 G/DL (ref 6.1–8.1)
RBC # BLD AUTO: 4.67 MILLION/UL (ref 3.8–5.1)
SODIUM SERPL-SCNC: 138 MMOL/L (ref 135–146)
TRIGL SERPL-MCNC: 185 MG/DL
TSH SERPL-ACNC: 2.55 MIU/L (ref 0.4–4.5)
WBC # BLD AUTO: 6.3 THOUSAND/UL (ref 3.8–10.8)

## 2025-06-22 DIAGNOSIS — N32.81 OAB (OVERACTIVE BLADDER): ICD-10-CM

## 2025-06-23 RX ORDER — FESOTERODINE FUMARATE 4 MG/1
1 TABLET, FILM COATED, EXTENDED RELEASE ORAL DAILY
Qty: 90 TABLET | Refills: 1 | Status: SHIPPED | OUTPATIENT
Start: 2025-06-23

## 2025-07-17 ENCOUNTER — TELEPHONE (OUTPATIENT)
Dept: PRIMARY CARE | Facility: CLINIC | Age: 85
End: 2025-07-17
Payer: MEDICARE

## 2025-07-17 DIAGNOSIS — I10 ESSENTIAL HYPERTENSION: ICD-10-CM

## 2025-07-17 DIAGNOSIS — I10 BENIGN ESSENTIAL HYPERTENSION: ICD-10-CM

## 2025-07-17 DIAGNOSIS — E78.5 DYSLIPIDEMIA, GOAL LDL BELOW 130: ICD-10-CM

## 2025-07-17 RX ORDER — METOPROLOL SUCCINATE 25 MG/1
25 TABLET, EXTENDED RELEASE ORAL DAILY
Qty: 90 TABLET | Refills: 1 | Status: SHIPPED | OUTPATIENT
Start: 2025-07-17

## 2025-07-17 RX ORDER — HYDROCHLOROTHIAZIDE 25 MG/1
25 TABLET ORAL DAILY
Qty: 90 TABLET | Refills: 1 | Status: SHIPPED | OUTPATIENT
Start: 2025-07-17

## 2025-07-17 RX ORDER — PRAVASTATIN SODIUM 20 MG/1
20 TABLET ORAL DAILY
Qty: 90 TABLET | Refills: 1 | Status: SHIPPED | OUTPATIENT
Start: 2025-07-17

## 2025-07-17 NOTE — TELEPHONE ENCOUNTER
MEDICATION REFILL    Pharmacy Name:    [facility]   Medication requested         Metoprolol succinate xl    25 mg  1x daily        Hydrochlorothiazide      25 mg   1 x daily        Pravastatin  20 mg       20 mg     1 x daily  Dosage [ see above ]   Quantity       90 days  Allergies    none given  Date of last visit   06/10/2025  Date of Next Appointment [  ]

## 2025-08-04 ENCOUNTER — TELEPHONE (OUTPATIENT)
Dept: PRIMARY CARE | Facility: CLINIC | Age: 85
End: 2025-08-04
Payer: MEDICARE

## 2025-08-04 DIAGNOSIS — G43.009 MIGRAINE WITHOUT AURA AND WITHOUT STATUS MIGRAINOSUS, NOT INTRACTABLE: ICD-10-CM

## 2025-08-04 RX ORDER — AMITRIPTYLINE HYDROCHLORIDE 50 MG/1
50 TABLET, FILM COATED ORAL NIGHTLY
Qty: 30 TABLET | Refills: 0 | Status: SHIPPED | OUTPATIENT
Start: 2025-08-04

## 2025-08-04 RX ORDER — AMITRIPTYLINE HYDROCHLORIDE 50 MG/1
50 TABLET, FILM COATED ORAL NIGHTLY
Qty: 90 TABLET | Refills: 3 | Status: SHIPPED | OUTPATIENT
Start: 2025-08-04 | End: 2026-08-04

## 2025-08-04 NOTE — TELEPHONE ENCOUNTER
Medication Refill    Pharmacy: [ [facility] ]    Medication: [ Amitriptyline 50 mg]    Quantity: [ 90 day ]    LOV: [ 06/13/25 ]    NOV: [ none ]    PT states she has 3 pills left and would like to use [facility] however is a hold over can be sent to  [facility] in [place] she would like to be contacted if this can be done.

## 2025-08-21 ENCOUNTER — APPOINTMENT (OUTPATIENT)
Dept: UROLOGY | Facility: CLINIC | Age: 85
End: 2025-08-21
Payer: MEDICARE

## 2025-08-21 DIAGNOSIS — N32.81 OAB (OVERACTIVE BLADDER): Primary | ICD-10-CM

## 2025-08-21 PROCEDURE — G2211 COMPLEX E/M VISIT ADD ON: HCPCS | Performed by: STUDENT IN AN ORGANIZED HEALTH CARE EDUCATION/TRAINING PROGRAM

## 2025-08-21 PROCEDURE — 99214 OFFICE O/P EST MOD 30 MIN: CPT | Performed by: STUDENT IN AN ORGANIZED HEALTH CARE EDUCATION/TRAINING PROGRAM

## 2025-08-21 RX ORDER — VIBEGRON 75 MG/1
75 TABLET, FILM COATED ORAL DAILY
Qty: 84 TABLET | Refills: 0 | COMMUNITY
Start: 2025-08-21 | End: 2025-11-13

## 2025-10-08 ENCOUNTER — APPOINTMENT (OUTPATIENT)
Dept: PRIMARY CARE | Facility: CLINIC | Age: 85
End: 2025-10-08
Payer: MEDICARE

## 2025-10-13 ENCOUNTER — APPOINTMENT (OUTPATIENT)
Dept: UROLOGY | Facility: CLINIC | Age: 85
End: 2025-10-13
Payer: MEDICARE

## 2025-10-17 ENCOUNTER — APPOINTMENT (OUTPATIENT)
Dept: PRIMARY CARE | Facility: CLINIC | Age: 85
End: 2025-10-17
Payer: MEDICARE

## (undated) DEVICE — DRAPE, SHEET, 17 X 23 IN

## (undated) DEVICE — DRAPE, C-ARM IMAGE

## (undated) DEVICE — SUTURE, MONOCRYL, 4-0, 18 IN, PS2, UNDYED

## (undated) DEVICE — NEEDLE, SAFETY, 22 G X 1.5 IN

## (undated) DEVICE — ADHESIVE, SKIN, LIQUIBAND EXCEED

## (undated) DEVICE — PERCUTANEOUS EXTENSION

## (undated) DEVICE — GLOVE, SURGICAL, PROTEXIS PI MICRO, 7.5, PF, LF

## (undated) DEVICE — APPLICATOR, CHLORAPREP, W/ORANGE TINT, 26ML

## (undated) DEVICE — DRAPE, SHEET, LAPAROTOMY, W/ISO-BAC, W/ARMBOARD COVERS, 98 X 122 IN, DISPOSABLE, LF, STERILE

## (undated) DEVICE — SUTURE, CTD, VICRYL, 2-0, UND, BR, CT-2

## (undated) DEVICE — SOLUTION, IRRIGATION, SODIUM CHLORIDE 0.9%, 1000 ML, POUR BOTTLE

## (undated) DEVICE — DRESSING, GAUZE, SPONGE, 12 PLY, CURITY, 4 X 4 IN, STERILE

## (undated) DEVICE — DRAPE COVER, C ARM, FLOUROSCAN IMAGING SYS

## (undated) DEVICE — DRAPE PACK, MINOR, CUSTOM, GEAUGA

## (undated) DEVICE — TRIAL, STIMULATOR, EXTERNAL

## (undated) DEVICE — DRESSING, TRANSPARENT, TEGADERM, 6 X 8 IN

## (undated) DEVICE — CAUTERY, PENCIL, PUSH BUTTON, SMOKE EVAC, 70MM

## (undated) DEVICE — SUTURE, VICRYL, 4-0, 18 IN, PS2, UNDYED